# Patient Record
Sex: FEMALE | URBAN - METROPOLITAN AREA
[De-identification: names, ages, dates, MRNs, and addresses within clinical notes are randomized per-mention and may not be internally consistent; named-entity substitution may affect disease eponyms.]

---

## 2017-12-21 ENCOUNTER — CLINICAL SUPPORT (OUTPATIENT)
Dept: FAMILY MEDICINE CLINIC | Age: 47
End: 2017-12-21

## 2017-12-21 DIAGNOSIS — E66.9 OBESITY, UNSPECIFIED CLASSIFICATION, UNSPECIFIED OBESITY TYPE, UNSPECIFIED WHETHER SERIOUS COMORBIDITY PRESENT: Primary | ICD-10-CM

## 2017-12-21 NOTE — PROGRESS NOTES
Patient attended a Medically Supervised Weight Loss New Patient Orientation today where we discussed:  - New Direction Very Low Calorie Diet details  - Medical Supervision  - Nutrition education  - Cost  - Policies and compliance required for program enrollment. Patients initial consultation with physician is tentatively scheduled for:  Future Appointments  Date Time Provider Juancho Ruiz   1/4/2018 9:30 AM Gail Antonio, DO [address] [facility] starting weight was documented as: There were no vitals taken for this visit. The following patient answers were pulled from Weight Loss Questionnaire regarding weight related illness: (refer to media section for full weight loss history)  Hypertension (high blood pressure)  yes   High cholesterol no   Hypothyroidism no   Obstructive sleep apnea no   Gout no   Arthritis yes ;     Heart Attack in the last 3 months: no     Type I Diabetes no   Type II Diabetes   no

## 2018-01-11 ENCOUNTER — OFFICE VISIT (OUTPATIENT)
Dept: FAMILY MEDICINE CLINIC | Age: 48
End: 2018-01-11

## 2018-01-11 VITALS
RESPIRATION RATE: 17 BRPM | SYSTOLIC BLOOD PRESSURE: 132 MMHG | HEART RATE: 59 BPM | DIASTOLIC BLOOD PRESSURE: 81 MMHG | WEIGHT: 291.7 LBS | BODY MASS INDEX: 49.8 KG/M2 | TEMPERATURE: 98.2 F | HEIGHT: 64 IN

## 2018-01-11 DIAGNOSIS — E66.01 OBESITY, MORBID (HCC): Primary | ICD-10-CM

## 2018-01-11 DIAGNOSIS — R73.03 PREDIABETES: ICD-10-CM

## 2018-01-11 RX ORDER — FEXOFENADINE HCL AND PSEUDOEPHEDRINE HCI 180; 240 MG/1; MG/1
1 TABLET, EXTENDED RELEASE ORAL DAILY
COMMUNITY

## 2018-01-11 RX ORDER — ESOMEPRAZOLE MAGNESIUM 40 MG/1
CAPSULE, DELAYED RELEASE ORAL DAILY
COMMUNITY
End: 2018-04-24

## 2018-01-11 RX ORDER — DEXTROMETHORPHAN HYDROBROMIDE, GUAIFENESIN 5; 100 MG/5ML; MG/5ML
650 LIQUID ORAL EVERY 8 HOURS
COMMUNITY

## 2018-01-11 RX ORDER — ATENOLOL 100 MG/1
50 TABLET ORAL DAILY
COMMUNITY
Start: 2017-11-15

## 2018-01-11 RX ORDER — DESVENLAFAXINE SUCCINATE 50 MG/1
50 TABLET, EXTENDED RELEASE ORAL DAILY
COMMUNITY
Start: 2017-12-13

## 2018-01-11 NOTE — PROGRESS NOTES
[facility] Weight Loss Program Progress Note: Initial Physician Visit     Enriqueta Son is a 52 y.o. female who is here for medical screening for entering the [facility] Weight Loss Program.     CC: Morbid Obesity      Weight History  I personally reviewed the Medical Screening Barbara Plain completed by patient and scanned into media section of chart. It includes duration of their obesity, maximum weight, goal weight and weight gain time line (timing), all of which give the context of their obesity AND a Family History of their obesity. Is their Weight Loss Goal entered in to Comments? Yes 198 to start    Weight loss History  I personally reviewed the Medical Screening Barbara Plain completed by the patient and scanned into media section of chart. It includes number of weight loss attempts, the weight loss program that patients was most successful using, and if they have any hx of anorectic medication use, including OTC supplements. This captures modifying factors. Significant Medical History  Past Medical History:   Diagnosis Date    Arthritis     Depression     GERD (gastroesophageal reflux disease)     Hypertension        I personally reviewed the Medical Screening Barbara Plain completed by the patient and scanned into media section of chart. This allows me to assess associated symptoms that are significant in the assessment of the patient's obesity and the patient's Past Medical History. Outpatient Prescriptions Marked as Taking for the 1/11/18 encounter (Office Visit) with Genaro Olivas, DO   Medication Sig Dispense Refill    atenolol (TENORMIN) 100 mg tablet Take 100 mg by mouth daily.  PRISTIQ 50 mg ER tablet Take 50 mg by mouth daily.  esomeprazole (NEXIUM) 40 mg capsule Take  by mouth daily.  fexofenadine-pseudoephedrine (ALLEGRA-D 24 HOUR) 180-240 mg per tablet Take 1 Tab by mouth daily.       acetaminophen (TYLENOL ARTHRITIS PAIN) 650 mg TbER Take 650 mg by mouth every eight (8) hours. Significant Psychosocial History   Has a doctor every diagnosed with Binge Eating Disorder, Bulemia or Anorexia? : no     Compliance  Upcoming Travel? yes    Social History  Social History   Substance Use Topics    Smoking status: Never Smoker    Smokeless tobacco: Never Used    Alcohol use No       Exercise  I personally reviewed the Medical Screening Del Frankel completed by the patient and scanned into media section of chart. Review of Systems  See HPI        Objective  Visit Vitals    /81    Pulse (!) 59    Temp 98.2 °F (36.8 °C) (Oral)    Resp 17    Ht 5' 4\" (1.626 m)    Wt 291 lb 11.2 oz (132.3 kg)    LMP 11/14/2017    BMI 50.07 kg/m2         Weight Metrics 1/11/2018 1/11/2018   Weight - 291 lb 11.2 oz   Waist Measure Inches 50 -   Body Fat % 50 -   BMI - 50.07 kg/m2       Labs: See labs scanned into Media section       Physical Exam    Vital Signs Reviewed  Weight Management Metrics Reviewed    Appearance: Morbidly Obese  HEENT:  Scleral icterus?  no  Neck:  Thyromegaly or nodules? no  Mouth: Large tongue yes  Heart:  RRR  Lungs:  LCTA-B  Abdomen:     Hepatomegaly? no   Striae present? no  Skin:    Acne?  no   Hirsutism? no   Skin tags? no   Acanthosis Nigricans?  no  Ext:  Edema? no      Assessment & Plan  Encounter Diagnoses   Name Primary?  Obesity, morbid ([address] Yes    Prediabetes        1. Labs reviewed w/ patient    2. EKG reviewed w/ patient:   Personally reviewed by me, NSR, No abnormalities,    QTc = 419 sec (Upper limit is 440 for males & 460 for females)    3. Medication changes include: None    4. Based on his history, labs and EKG, Dada Ayala is now enrolled for the [facility] Weight Loss Program.     5.  Individualized Patient Plan is as follows:   Diet Regimen: 4 Meal Replacements daily.    Weigh in: weekly at [facility]   BP f/up plan:   weekly at [facility]       25 min of the 45 minutes face to face time with Malena Redd consisted of counseling & coordinating and/or discussing treatment plans in reference to her above mentioned diagnoses.

## 2018-01-11 NOTE — MR AVS SNAPSHOT
Visit Information Date & Time Provider Department Dept. Phone Encounter #  
 1/11/2018 10:30 AM Mery Kovacs, Herb[facility],[address] [facility] [phone] [id number] Follow-up Instructions Return in about 6 weeks (around 2/22/2018) for MSWL & Lab Draw. Upcoming Health Maintenance Date Due DTaP/Tdap/Td series (1 - Tdap) 9/23/1991 PAP AKA CERVICAL CYTOLOGY 9/23/1991 Influenza Age 5 to Adult 8/1/2017 Allergies as of 1/11/2018  Review Complete On: 1/11/2018 By: Mery Kovacs DO Severity Noted Reaction Type Reactions Bactrim [Sulfamethoprim]  01/11/2018    Hives Current Immunizations  Reviewed on 1/11/2018 No immunizations on file. Reviewed by Cecily Angel LPN on 5/44/0455 at 89:79 AM  
You Were Diagnosed With   
  
 Codes Comments Obesity, morbid ([facility]    -  Primary ICD-10-CM: E66.01 
ICD-9-CM: 278.01 Prediabetes     ICD-10-CM: R73.03 
ICD-9-CM: 790.29 Vitals BP Pulse Temp Resp Height(growth percentile) Weight(growth percentile) 132/81 (!) 59 98.2 °F (36.8 °C) (Oral) 17 5' 4\" (1.626 m) 291 lb 11.2 oz (132.3 kg) LMP BMI OB Status Smoking Status 11/14/2017 50.07 kg/m2 Premenopausal Never Smoker Vitals History BMI and BSA Data Body Mass Index Body Surface Area 50.07 kg/m 2 2.44 m 2 Your Updated Medication List  
  
   
This list is accurate as of: 1/11/18 11:38 AM.  Always use your most recent med list. ALLEGRA-D 24 HOUR 180-240 mg per tablet Generic drug:  fexofenadine-pseudoephedrine Take 1 Tab by mouth daily. atenolol 100 mg tablet Commonly known as:  TENORMIN Take 100 mg by mouth daily. NexIUM 40 mg capsule Generic drug:  esomeprazole Take  by mouth daily. PRISTIQ 50 mg ER tablet Generic drug:  desvenlafaxine succinate Take 50 mg by mouth daily. TYLENOL ARTHRITIS PAIN 650 mg Darus Serene Generic drug:  acetaminophen Take 650 mg by mouth every eight (8) hours. Follow-up Instructions Return in about 6 weeks (around 2/22/2018) for MSWL & Lab Draw. Patient Instructions Talk with your doctor about switching to lisinopirl instead of antenolol. Homework for [employer] 
 
 
 
Exercise Exercise daily  
- Start slow, gradually increase your time by around 10 to 20 % a week - To prevent injury, take a recovery week every 4 weeks (reduce your exercise time and intensity by 1/2 during this time) - Your goal is to work up to 150 min a week; hard enough that you can't whistle or sing. It may take 6 months to work up to this. - Call the Health  at [facility] labs for exercise ideas ([phone]) Common Side Effects (In order of how common) -Fatigue 
-Hunger 
-Constipation 
-Low sodium 
-Hair Loss 1. Fatigue Everyone goes through this stage It's normal 
It lasts 10 - 14 days It goes away It's your body adjusting to the Ketogenic diet and it's normal  
 
 
2. Hunger More common in the first few days After your body adjusts to the Ketogenic diet it will go away 3. Constipation  
-Drink at least 64 oz of non-calorie fluid a day 
-Add fiber (at least 20 grams a day) 1. Get the New Direction products with fiber added 2. Use SUGAR-FREE products: Fiber One products, Benefiber or Metamucil If you're prone to constipation: Take a Stool Softener every day. 
   (eg - Dulcolax Stool Softener 100 mg or Miralax) If you get constipated: 1. Start with a Stool Softener (produces a bowel movement in 72 hr): 
 Examples: 
    Miralax 1 capful twice a day (It's OK to go up to 3 caps for a few days) Dulcolax Stool Softener 100 mg 2. Next: If you still have constipation after 2 or 3 days, add a Stimulant Laxative (this will produce a bowel movement in 6 - 12 hr): 
 Examples: Exlax (1 small square) for up to 4 days Dulcolax stimulant laxative (8.25 mg) Magnesium citrate pill 500 mg 3 - 4 pills a day 3. Or you can go straight to a combination Stool Softner / Stimulant at night. If  you need, you can add a morning dose. Examples: 
    Pericolace 50 mg / 8.25 mg Sennakot-S  50 mg / 8.25 mg 
 
   4. Finally If You're Really locked up, get Liquid Magnesium Citrate (take  according to the directions) 4. Low blood levels of sodium 
-Not very common, especially if you're not taking a diuretic (fluid pill) If you develop a headache, feel fatigued, light-headed or dizzy Drink 1/2 cup of bouillon broth up to twice a day until you feel normal 
 
 
5. Hair loss 
-This is fairly uncommon; you may see more than a usual amount of hair in your brush or the shower drain This can happen with any physical stress (surgery, trauma or calorie restriction) or psychological stress. Although is it very concerning, it is often temporary and will resolve within 3 months. Some people notice a benefit from taking a daily dose of Biotin 2,500 mcg and increasing their Fish Oil intake. Books to [address] [place] [address] 1. Change Anything: The [employer] of Personal Success 
by Adenike Magana, Dominique Cabezas, and Maryuri Fenton 2. Mindless Eating 
by Quin Veag 3. Perfectly Yourself 
by Amado Beebe 4. Me, Myself and I - 28 Days to Creative Self-Love 
by Diana Ramirez version only Note: Reading these books is a small but significant investment in yourself. Merely following the diet will reliably result in weight loss. However, revising how you respond to stressful situations, how you relate to food and, your commitment to self-care (adequate sleep, exercise & daily reflection) is the ONLY way to protect your weight loss and free yourself from your patterns that lead to weight gain. Compliance We do not expect perfection. However, we do ask for your persistence and your willingness to do the work of growing yourself. You will hit plateaus in your weight loss. You will run into situations that test your will power. Bertha Vidal will encounter times when you feel frustrated. It's OK if you slip up from time to time. However, how your respond to your slip ups and, the adjustments you make to prevent future slip ups will determine you long-term success. If you find yourself temporarily slipping in the program, it's OK. We will gently nudge you forward and encourage you to do the work of identifying and moving beyond your stuck areas. However, if you find yourself wavering in the program for a prolonged time (more than 3 or 4 months) we will ask that you take a break from the program.  At that time you will 3 options:  
 
1. Work with our weight loss specialist [place], [place] to explore additional weight loss options. 2. Work with a counselor to do some focused work in order to identify and break the patterns that are holding you back. 3.  The combination of both these. Once you, your counselor and/or Priya feel that you have moved past those patterns and want to give the program another chance, we will gladly work with you to determine if you're ready to start back in the program. 
 
When returning after a break, if it's been less than 3 months, you do not need to repeat an orientation, labs or an EKG. If it's over been 3 months you will required to repeat an orientation and, if greater than 6 months, you will be required to repeat an orientation, labs and an EKG. Additional Tips 
 
- Consume your 4 daily meal replacements equally spaced over the    day No more than 6 hours between each meal 
 Breakfast is especially important - Get the support of family and friends 
 
- Snack-proof your house - Have strategies for social situations, meetings that run over or vacation - When you fall off the plan it's no big deal; just start right back. Turn those days into examples of what to change or avoid next time. Long-term Healthy Weight / Body Fat Different ways to determining your ideal weight: 
1. Keep your waist to less than 1/2 of your height 2. Keep your % body fat to under 30% for female and under 20% if male 3. Keep your BMI around 25 Supplements 1. Vitacost Synergy Basic Multi-Vitamin (Their In-House Brand) Take 1 capsule twice a day Found ONLY at [facility], NOT at [facility], [facility], [employer], etc 
    SKU #: [id number]  
    $16.49   / 1 month supply 
    www. "[employer]"  [phone]  
 
 
2. N-Acetyl Cysteine (NAC) -- 600 mg 
     1 pill once a day Found at many stores but [address] has a good price: 
     Item #: [id number]  $9.99 / 120 pills (4 month supply) 
     www. "[employer]"  [phone] 3. Turmeric with Black Pepper Extract (or Bioperine) 500 mg 
    1 pill 2 - 3 times a day (more is joint pain or increased inflammation) Found at many stores but [address] has a good price: 
    SKU #: [id number]  $13.64 / 61 pills 
    www. "[employer]"  [phone] 4. Probiotic: 15-35 (35 billion CFU) 1 capsule twice a day for 2 weeks, then 3 days a week SKU #: [id number]  $27.99 / 120 capsules 
     www. "[employer]"  [phone] 5. Fish Oil Take 1 capsule daily FYI:  
Typical fish oil per pill =  mg and  mg 
Krill oil per pill = EPA 50 - 70 mg and DHA 27 - 250 mg 
 
6. Other things to help with will power -Katharine Knoll Remedies Crab Apple - Helps you with self acceptance Put 4 drops in 10 oz water or a meal replacement 2 - 4 times a day Around $15 a vial which lasts ~3 months 
       www. "[employer]"  [phone] Drink Filtered Water My favorite water filter (adds minerals to your water and cheaper than Daysi) pH REFRESH Alkaline Water Pitcher Ionizer With 2 Long-Life Filters - Alkaline  Machine - Ionized Water Alkalizer Filter, 84oz, 2.5L (2017 Model) Feng Panch[place]) Sold on [employer] w/ Free Shipping 
 
 
 
^^^^^^^^^^^^^^^^^^^^^^^^^^^^^^^^^^^^^^^^^^^^^^^^^^^^^^^^^^^^^^^^^^^^^^^^^^^^^^^^^^^^^^^^^^^^^^^ Carbohydrates If you do not metabolize carbohydrates well, you will lose weight and keep the weigh off by keeping your carbohydrate intake low. How do you know if you do not metabolize carbs well? If your Triglycerides, sdLCL-C and Insulin Resistance are elevated, then you will do well to follow a low carb diet. Fun Facts About Carbs - The Typical American Diet = 450 grams a day - The Reducing Phase of the VLCD = 40 grams a day - Target for weight loss maintenance: 
 - Eat no more than 30 grams per meal 
 - Eat no more than 10 grams per snack (mid-morning and mid-afternoon snack) Resources for finding out where carbs hide in our foods: 
1. Our Registered Dietitians 2. Www. Atkins. com/tools 3. Read food labels 4. Books - The Calorie Leona Avila - The New Atkins Diet for a New You 
     - Sol Raygoza's NEW Carb and Calorie 4th Edition (my favorite) 5. Smart phone and Internet-based apps - [employer]  
     - Carb Manager If you want to get a better picture of your cardiac risk, consider getting a coronary calcium score:  Call [phone] to schedule one. Body Mass Index: Care Instructions Your Care Instructions Body mass index (BMI) can help you see if your weight is raising your risk for health problems. It uses a formula to compare how much you weigh with how tall you are. · A BMI lower than 18.5 is considered underweight. · A BMI between 18.5 and 24.9 is considered healthy. · A BMI between 25 and 29.9 is considered overweight.  A BMI of 30 or higher is considered obese. If your BMI is in the normal range, it means that you have a lower risk for weight-related health problems. If your BMI is in the overweight or obese range, you may be at increased risk for weight-related health problems, such as high blood pressure, heart disease, stroke, arthritis or joint pain, and diabetes. If your BMI is in the underweight range, you may be at increased risk for health problems such as fatigue, lower protection (immunity) against illness, muscle loss, bone loss, hair loss, and hormone problems. BMI is just one measure of your risk for weight-related health problems. You may be at higher risk for health problems if you are not active, you eat an unhealthy diet, or you drink too much alcohol or use tobacco products. Follow-up care is a key part of your treatment and safety. Be sure to make and go to all appointments, and call your doctor if you are having problems. It's also a good idea to know your test results and keep a list of the medicines you take. How can you care for yourself at home? · Practice healthy eating habits. This includes eating plenty of fruits, vegetables, whole grains, lean protein, and low-fat dairy. · If your doctor recommends it, get more exercise. Walking is a good choice. Bit by bit, increase the amount you walk every day. Try for at least 30 minutes on most days of the week. · Do not smoke. Smoking can increase your risk for health problems. If you need help quitting, talk to your doctor about stop-smoking programs and medicines. These can increase your chances of quitting for good. · Limit alcohol to 2 drinks a day for men and 1 drink a day for women. Too much alcohol can cause health problems. If you have a BMI higher than 25 · Your doctor may do other tests to check your risk for weight-related health problems.  This may include measuring the distance around your waist. A waist measurement of more than 40 inches in men or 35 inches in women can increase the risk of weight-related health problems. · Talk with your doctor about steps you can take to stay healthy or improve your health. You may need to make lifestyle changes to lose weight and stay healthy, such as changing your diet and getting regular exercise. If you have a BMI lower than 18.5 · Your doctor may do other tests to check your risk for health problems. · Talk with your doctor about steps you can take to stay healthy or improve your health. You may need to make lifestyle changes to gain or maintain weight and stay healthy, such as getting more healthy foods in your diet and doing exercises to build muscle. Where can you learn more? Go to http://cuong-mary kay.info/. Enter S176 in the search box to learn more about \"Body Mass Index: Care Instructions. \" Current as of: October 13, 2016 Content Version: 11.4 © 7693-4646 [employer]. Care instructions adapted under license by [employer] (which disclaims liability or warranty for this information). If you have questions about a medical condition or this instruction, always ask your healthcare professional. [place] [address] any warranty or liability for your use of this information. Introducing [facility]! Charlene Hawley introduces [employer] patient portal. Now you can access parts of your medical record, email your doctor's office, and request medication refills online. 1. In your internet browser, go to [email or web address] 2. Click on the First Time User? Click Here link in the Sign In box. You will see the New Member Sign Up page. 3. Enter your [employer] Access Code exactly as it appears below. You will not need to use this code after youve completed the sign-up process. If you do not sign up before the expiration date, you must request a new code. · [employer] Access Code: [email or web address] Expires: 4/11/2018 11:38 AM 
 
4. Enter the last four digits of your Social Security Number (xxxx) and Date of Birth (mm/dd/yyyy) as indicated and click Submit. You will be taken to the next sign-up page. 5. Create a [employer] ID. This will be your [employer] login ID and cannot be changed, so think of one that is secure and easy to remember. 6. Create a [employer] password. You can change your password at any time. 7. Enter your Password Reset Question and Answer. This can be used at a later time if you forget your password. 8. Enter your e-mail address. You will receive e-mail notification when new information is available in [address] 9. Click Sign Up. You can now view and download portions of your medical record. 10. Click the Download Summary menu link to download a portable copy of your medical information. If you have questions, please visit the Frequently Asked Questions section of the [employer] website. Remember, [employer] is NOT to be used for urgent needs. For medical emergencies, dial 911. Now available from your iPhone and Android! Please provide this summary of care documentation to your next provider. If you have any questions after today's visit, please call [phone].

## 2018-01-11 NOTE — PATIENT INSTRUCTIONS
Talk with your doctor about switching to lisinopirl instead of antenolol. Homework for [employer]        Exercise    Exercise daily   - Start slow, gradually increase your time by around 10 to 20 % a week    - To prevent injury, take a recovery week every 4 weeks (reduce your exercise time and intensity by 1/2 during this time)    - Your goal is to work up to 150 min a week; hard enough that you can't whistle or sing. It may take 6 months to work up to this. - Call the Health  at [facility] for exercise ideas ([phone])          Common Side Effects   (In order of how common)    -Fatigue  -Hunger  -Constipation  -Low sodium  -Hair Loss      1. Fatigue  Everyone goes through this stage  It's normal  It lasts 10 - 14 days  It goes away  It's your body adjusting to the Ketogenic diet and it's normal       2. Hunger  More common in the first few days  After your body adjusts to the Ketogenic diet it will go away       3. Constipation   -Drink at least 64 oz of non-calorie fluid a day  -Add fiber (at least 20 grams a day)     1. Get the New Direction products with fiber added     2. Use SUGAR-FREE products: Fiber One products, Benefiber or Metamucil    If you're prone to constipation: Take a Stool Softener every day.     (eg - Dulcolax Stool Softener 100 mg or Miralax)    If you get constipated:     1. Start with a Stool Softener (produces a bowel movement in 72 hr):   Examples:      Miralax 1 capful twice a day (It's OK to go up to 3 caps for a few days)      Dulcolax Stool Softener 100 mg       2. Next: If you still have constipation after 2 or 3 days, add a Stimulant          Laxative (this will produce a bowel movement in 6 - 12 hr):   Examples:      Exlax (1 small square) for up to 4 days      Dulcolax stimulant laxative (8.25 mg)       Magnesium citrate pill 500 mg 3 - 4 pills a day       3.   Or you can go straight to a combination Stool Softner / Stimulant at night.  If  you need, you can add a morning dose. Examples:      Pericolace 50 mg / 8.25 mg      Sennakot-S  50 mg / 8.25 mg       4. Finally If You're Really locked up, get Liquid Magnesium Citrate (take  according to the directions)      4. Low blood levels of sodium  -Not very common, especially if you're not taking a diuretic (fluid pill)  If you develop a headache, feel fatigued, light-headed or dizzy    Drink 1/2 cup of bouillon broth up to twice a day until you feel normal      5. Hair loss  -This is fairly uncommon; you may see more than a usual amount of hair in your brush or the shower drain  This can happen with any physical stress (surgery, trauma or calorie restriction) or psychological stress. Although is it very concerning, it is often temporary and will resolve within 3 months. Some people notice a benefit from taking a daily dose of Biotin 2,500 mcg and increasing their Fish Oil intake. Books to [address] [place] [address]    1. Change Anything: The [employer] of Personal Success  by Uziel Ortiz, Roseline Abrams, and Donta Artist    2. Mindless Eating  by Augustine Orr    3. Perfectly Yourself  by Harris Calvin    4. Me, Myself and I - 28 Days to Creative Self-Love  by Bruce Brody version only    Note: Reading these books is a small but significant investment in yourself. Merely following the diet will reliably result in weight loss. However, revising how you respond to stressful situations, how you relate to food and, your commitment to self-care (adequate sleep, exercise & daily reflection) is the ONLY way to protect your weight loss and free yourself from your patterns that lead to weight gain. Compliance    We do not expect perfection. However, we do ask for your persistence and your willingness to do the work of growing yourself. You will hit plateaus in your weight loss. You will run into situations that test your will power.   Kami Angulo will encounter times when you feel frustrated. It's OK if you slip up from time to time. However, how your respond to your slip ups and, the adjustments you make to prevent future slip ups will determine you long-term success. If you find yourself temporarily slipping in the program, it's OK. We will gently nudge you forward and encourage you to do the work of identifying and moving beyond your stuck areas. However, if you find yourself wavering in the program for a prolonged time (more than 3 or 4 months) we will ask that you take a break from the program.  At that time you will 3 options:     1. Work with our weight loss specialist LEIXS Madsen to explore additional weight loss options. 2. Work with a counselor to do some focused work in order to identify and break the patterns that are holding you back. 3.  The combination of both these. Once you, your counselor and/or Priya feel that you have moved past those patterns and want to give the program another chance, we will gladly work with you to determine if you're ready to start back in the program.    When returning after a break, if it's been less than 3 months, you do not need to repeat an orientation, labs or an EKG. If it's over been 3 months you will required to repeat an orientation and, if greater than 6 months, you will be required to repeat an orientation, labs and an EKG. Additional Tips    - Consume your 4 daily meal replacements equally spaced over the    day   No more than 6 hours between each meal   Breakfast is especially important    - Get the support of family and friends    - Snack-proof your house    - Have strategies for social situations, meetings that run over or vacation      - When you fall off the plan it's no big deal; just start right back. Turn those days into examples of what to change or avoid next time. Long-term Healthy Weight / Body Fat  Different ways to determining your ideal weight:  1.  Keep your waist to less than 1/2 of your height   2. Keep your % body fat to under 30% for female and under 20% if male  3. Keep your BMI around 25          Supplements      1. Vitacost Synergy Basic Multi-Vitamin (Their In-House Brand)      Take 1 capsule twice a day        Found ONLY at [facility], NOT at [facility], [place], [facility], the [facility], etc      SKU #: [id number]       $16.49   / 1 month supply      www. Admittedly  [phone]       2. N-Acetyl Cysteine (NAC) -- 600 mg       1 pill once a day       Found at many stores but Aj Ibarra has a good price:       Item #: [id number]  $9.99 / 120 pills (4 month supply)       www. Admittedly  [phone]      3. Turmeric with Black Pepper Extract (or Bioperine) 500 mg      1 pill 2 - 3 times a day (more is joint pain or increased inflammation)      Found at many stores but [employer] has a good price:      SKU #: [id number]  $13.64 / 60 pills      www. [employer]mitted[employer]  [phone]       4. Probiotic: 15-35 (35 billion CFU)         1 capsule twice a day for 2 weeks, then 3 days a week       SKU #: [id number]  $27.99 / 120 capsules       www. [employer]mittedly  [phone]       5. Fish Oil      Take 1 capsule daily                             FYI:   Typical fish oil per pill =  mg and  mg  Krill oil per pill = EPA 50 - 70 mg and DHA 27 - 250 mg    6. Other things to help with will power      -[address] you with self acceptance           Put 4 drops in 10 oz water or a meal replacement 2 - 4 times a day         Around $15 a vial which lasts ~3 months         www. [employer]  [phone]                Drink Filtered Water    My favorite water filter (adds minerals to your water and cheaper than Daysi)    pH REFRESH Alkaline Water Pitcher Ionizer With 2 Long-Life Filters - Alkaline  Machine - Ionized Water Alkalizer Filter, 84oz, 2.5L (2017 Model) (White)   Sold on [employer] w/ Free Shipping        ^^^^^^^^^^^^^^^^^^^^^^^^^^^^^^^^^^^^^^^^^^^^^^^^^^^^^^^^^^^^^^^^^^^^^^^^^^^^^^^^^^^^^^^^^^^^^^^      Carbohydrates     If you do not metabolize carbohydrates well, you will lose weight and keep the weigh off by keeping your carbohydrate intake low. How do you know if you do not metabolize carbs well? If your Triglycerides, sdLCL-C and Insulin Resistance are elevated, then you will do well to follow a low carb diet. Fun Facts About Carbs    - The Typical American Diet = 450 grams a day    - The Reducing Phase of the VLCD = 40 grams a day    - Target for weight loss maintenance:   - Eat no more than 30 grams per meal   - Eat no more than 10 grams per snack (mid-morning and mid-afternoon snack)        Resources for finding out where carbs hide in our foods:  1. Our Registered Dietitians    2. Www. Atkins. com/tools    3. Read food labels    4. Books       - The Calorie Malika Moors       - The Trey System Diet for a New You       - Sol Raygoza's NEW Carb and Calorie 4th Edition (my favorite)    5. Smart phone and Internet-based apps       - NONOPal        - Carb Manager      If you want to get a better picture of your cardiac risk, consider getting a coronary calcium score:  Call [phone] to schedule one. Body Mass Index: Care Instructions  Your Care Instructions    Body mass index (BMI) can help you see if your weight is raising your risk for health problems. It uses a formula to compare how much you weigh with how tall you are. · A BMI lower than 18.5 is considered underweight. · A BMI between 18.5 and 24.9 is considered healthy. · A BMI between 25 and 29.9 is considered overweight. A BMI of 30 or higher is considered obese. If your BMI is in the normal range, it means that you have a lower risk for weight-related health problems.  If your BMI is in the overweight or obese range, you may be at increased risk for weight-related health problems, such as high blood pressure, heart disease, stroke, arthritis or joint pain, and diabetes. If your BMI is in the underweight range, you may be at increased risk for health problems such as fatigue, lower protection (immunity) against illness, muscle loss, bone loss, hair loss, and hormone problems. BMI is just one measure of your risk for weight-related health problems. You may be at higher risk for health problems if you are not active, you eat an unhealthy diet, or you drink too much alcohol or use tobacco products. Follow-up care is a key part of your treatment and safety. Be sure to make and go to all appointments, and call your doctor if you are having problems. It's also a good idea to know your test results and keep a list of the medicines you take. How can you care for yourself at home? · Practice healthy eating habits. This includes eating plenty of fruits, vegetables, whole grains, lean protein, and low-fat dairy. · If your doctor recommends it, get more exercise. Walking is a good choice. Bit by bit, increase the amount you walk every day. Try for at least 30 minutes on most days of the week. · Do not smoke. Smoking can increase your risk for health problems. If you need help quitting, talk to your doctor about stop-smoking programs and medicines. These can increase your chances of quitting for good. · Limit alcohol to 2 drinks a day for men and 1 drink a day for women. Too much alcohol can cause health problems. If you have a BMI higher than 25  · Your doctor may do other tests to check your risk for weight-related health problems. This may include measuring the distance around your waist. A waist measurement of more than 40 inches in men or 35 inches in women can increase the risk of weight-related health problems. · Talk with your doctor about steps you can take to stay healthy or improve your health.  You may need to make lifestyle changes to lose weight and stay healthy, such as changing your diet and getting regular exercise. If you have a BMI lower than 18.5  · Your doctor may do other tests to check your risk for health problems. · Talk with your doctor about steps you can take to stay healthy or improve your health. You may need to make lifestyle changes to gain or maintain weight and stay healthy, such as getting more healthy foods in your diet and doing exercises to build muscle. Where can you learn more? Go to http://cuong-mary kay.info/. Enter S176 in the search box to learn more about \"Body Mass Index: Care Instructions. \"  Current as of: October 13, 2016  Content Version: 11.4  © 9306-9903 [employer]. Care instructions adapted under license by [employer] (which disclaims liability or warranty for this information). If you have questions about a medical condition or this instruction, always ask your healthcare professional. [address] any warranty or liability for your use of this information.

## 2018-01-19 ENCOUNTER — CLINICAL SUPPORT (OUTPATIENT)
Dept: FAMILY MEDICINE CLINIC | Age: 48
End: 2018-01-19

## 2018-01-19 VITALS
DIASTOLIC BLOOD PRESSURE: 77 MMHG | WEIGHT: 285 LBS | HEIGHT: 64 IN | HEART RATE: 72 BPM | BODY MASS INDEX: 48.65 KG/M2 | SYSTOLIC BLOOD PRESSURE: 138 MMHG

## 2018-01-19 DIAGNOSIS — E66.01 OBESITY, MORBID (HCC): Primary | ICD-10-CM

## 2018-01-25 ENCOUNTER — CLINICAL SUPPORT (OUTPATIENT)
Dept: FAMILY MEDICINE CLINIC | Age: 48
End: 2018-01-25

## 2018-01-25 VITALS
SYSTOLIC BLOOD PRESSURE: 139 MMHG | DIASTOLIC BLOOD PRESSURE: 87 MMHG | WEIGHT: 281.9 LBS | BODY MASS INDEX: 48.13 KG/M2 | HEART RATE: 54 BPM | HEIGHT: 64 IN

## 2018-01-25 DIAGNOSIS — E66.01 OBESITY, MORBID (HCC): Primary | ICD-10-CM

## 2018-01-25 NOTE — PROGRESS NOTES
Progress Note: Weekly Education Class in the [facility]   Is there anything that you or the patient needs to let Dr Cheko Gipson know about? no  Over the past week, have you experienced any side-effects? no    Rancho Quiles is a 52 y.o. female who is enrolled in [facility]    Visit Vitals    /87    Pulse (!) 54    Ht 5' 4\" (1.626 m)    Wt 281 lb 14.4 oz (127.9 kg)    BMI 48.39 kg/m2     Weight Metrics 1/25/2018 1/19/2018 1/11/2018 1/11/2018   Weight 281 lb 14.4 oz 285 lb - 291 lb 11.2 oz   Waist Measure Inches 49 - 50 -   Body Fat % 48.3 - 50 -   BMI 48.39 kg/m2 48.92 kg/m2 - 50.07 kg/m2         Have you received any other medical care this week? no  If yes, where and for what? Have you had any change in your medications since your last visit? no  If yes what? Did you have any problems adhering to the program last week? no  If yes, please explain:       Eating Habits Over Last Week:  Did you take in 64 oz of non-caloric fluids? yes     Did you consume your 4 meal replacements each day?  yes       Physical Activity Over the Past Week:    Aerobic exercise: 90 min  Resistance exercise: 0 workouts / week

## 2018-02-02 ENCOUNTER — CLINICAL SUPPORT (OUTPATIENT)
Dept: FAMILY MEDICINE CLINIC | Age: 48
End: 2018-02-02

## 2018-02-02 VITALS
SYSTOLIC BLOOD PRESSURE: 139 MMHG | HEIGHT: 64 IN | WEIGHT: 281 LBS | BODY MASS INDEX: 47.97 KG/M2 | DIASTOLIC BLOOD PRESSURE: 86 MMHG

## 2018-02-02 DIAGNOSIS — E66.01 MORBID OBESITY (HCC): Primary | ICD-10-CM

## 2018-02-02 NOTE — PROGRESS NOTES
Progress Note: Weekly Education Class in the [facility]   Is there anything that you or the patient needs to let Dr Lupis Hayward know about? no  Over the past week, have you experienced any side-effects? no    Dwain Baker is a 52 y.o. female who is enrolled in [facility]    Visit Vitals    /86 (BP 1 Location: Left arm, BP Patient Position: Sitting)    Ht 5' 4\" (1.626 m)    Wt 281 lb (127.5 kg)    BMI 48.23 kg/m2     Weight Metrics 2/2/2018 1/25/2018 1/19/2018 1/11/2018 1/11/2018   Weight 281 lb 281 lb 14.4 oz 285 lb - 291 lb 11.2 oz   Waist Measure Inches 50 49 - 50 -   Exercise Mins/week 30 - - - -   Body Fat % 48.2 48.3 - 50 -   BMI 48.23 kg/m2 48.39 kg/m2 48.92 kg/m2 - 50.07 kg/m2         Have you received any other medical care this week? no  If yes, where and for what? Have you had any change in your medications since your last visit? no  If yes what? Did you have any problems adhering to the program last week? no  If yes, please explain:       Eating Habits Over Last Week:  Did you take in 64 oz of non-caloric fluids? yes     Did you consume your 4 meal replacements each day?  yes       Physical Activity Over the Past Week:    Aerobic exercise: 120 min  Resistance exercise: 5 workouts / week

## 2018-02-09 ENCOUNTER — CLINICAL SUPPORT (OUTPATIENT)
Dept: FAMILY MEDICINE CLINIC | Age: 48
End: 2018-02-09

## 2018-02-16 ENCOUNTER — CLINICAL SUPPORT (OUTPATIENT)
Dept: FAMILY MEDICINE CLINIC | Age: 48
End: 2018-02-16

## 2018-02-16 VITALS
DIASTOLIC BLOOD PRESSURE: 93 MMHG | WEIGHT: 271.7 LBS | BODY MASS INDEX: 46.38 KG/M2 | HEIGHT: 64 IN | HEART RATE: 83 BPM | SYSTOLIC BLOOD PRESSURE: 138 MMHG

## 2018-02-16 DIAGNOSIS — E66.01 MORBID OBESITY (HCC): Primary | ICD-10-CM

## 2018-02-16 NOTE — PROGRESS NOTES
Progress Note: Weekly Education Class in the [facility] Program   Is there anything that you or the patient needs to let Dr Gabi Hoffman know about? no  Over the past week, have you experienced any side-effects? no    Lonn Sites is a 52 y.o. female who is enrolled in [facility]    Visit Vitals    BP (!) 138/93    Pulse 83    Ht 5' 4\" (1.626 m)    Wt 271 lb 11.2 oz (123.2 kg)    BMI 46.64 kg/m2     Weight Metrics 2/16/2018 2/2/2018 1/25/2018 1/19/2018 1/11/2018 1/11/2018   Weight 271 lb 11.2 oz 281 lb 281 lb 14.4 oz 285 lb - 291 lb 11.2 oz   Waist Measure Inches 50 50 49 - 50 -   Exercise Mins/week - 30 - - - -   Body Fat % 46 48.2 48.3 - 50 -   BMI 46.64 kg/m2 48.23 kg/m2 48.39 kg/m2 48.92 kg/m2 - 50.07 kg/m2         Have you received any other medical care this week? no  If yes, where and for what? Have you had any change in your medications since your last visit? no  If yes what? Did you have any problems adhering to the program last week? no  If yes, please explain:       Eating Habits Over Last Week:  Did you take in 64 oz of non-caloric fluids? yes     Did you consume your 4 meal replacements each day?  yes       Physical Activity Over the Past Week:    Aerobic exercise: 0 min  Resistance exercise: 0 workouts / week

## 2018-02-23 ENCOUNTER — CLINICAL SUPPORT (OUTPATIENT)
Dept: FAMILY MEDICINE CLINIC | Age: 48
End: 2018-02-23

## 2018-02-23 VITALS
SYSTOLIC BLOOD PRESSURE: 118 MMHG | WEIGHT: 268.2 LBS | BODY MASS INDEX: 45.79 KG/M2 | HEIGHT: 64 IN | HEART RATE: 55 BPM | DIASTOLIC BLOOD PRESSURE: 74 MMHG

## 2018-02-23 DIAGNOSIS — E66.01 MORBID OBESITY (HCC): Primary | ICD-10-CM

## 2018-02-23 NOTE — PROGRESS NOTES
Progress Note: Weekly Education Class in the [facility]   Is there anything that you or the patient needs to let Dr Quintero Nicholas know about? no  Over the past week, have you experienced any side-effects? no    Jacoby Nolan is a 52 y.o. female who is enrolled in [facility]    Visit Vitals    /74    Pulse (!) 55    Ht 5' 4\" (1.626 m)    Wt 268 lb 3.2 oz (121.7 kg)    BMI 46.04 kg/m2     Weight Metrics 2/23/2018 2/16/2018 2/2/2018 1/25/2018 1/19/2018 1/11/2018 1/11/2018   Weight 268 lb 3.2 oz 271 lb 11.2 oz 281 lb 281 lb 14.4 oz 285 lb - 291 lb 11.2 oz   Waist Measure Inches 48 50 50 49 - 50 -   Exercise Mins/week - - 30 - - - -   Body Fat % 46 46 48.2 48.3 - 50 -   BMI 46.04 kg/m2 46.64 kg/m2 48.23 kg/m2 48.39 kg/m2 48.92 kg/m2 - 50.07 kg/m2         Have you received any other medical care this week? no  If yes, where and for what? Have you had any change in your medications since your last visit? no  If yes what? Did you have any problems adhering to the program last week? no  If yes, please explain:       Eating Habits Over Last Week:  Did you take in 64 oz of non-caloric fluids? yes     Did you consume your 4 meal replacements each day?  yes       Physical Activity Over the Past Week:    Aerobic exercise: 0 min  Resistance exercise: 0 workouts / week

## 2018-03-02 ENCOUNTER — CLINICAL SUPPORT (OUTPATIENT)
Dept: FAMILY MEDICINE CLINIC | Age: 48
End: 2018-03-02

## 2018-03-02 VITALS
HEART RATE: 66 BPM | WEIGHT: 264.7 LBS | DIASTOLIC BLOOD PRESSURE: 90 MMHG | HEIGHT: 64 IN | BODY MASS INDEX: 45.19 KG/M2 | SYSTOLIC BLOOD PRESSURE: 137 MMHG

## 2018-03-02 DIAGNOSIS — E66.01 MORBID OBESITY (HCC): Primary | ICD-10-CM

## 2018-03-02 NOTE — PROGRESS NOTES
Progress Note: Weekly Education Class in the [facility] Weight Loss Program   Is there anything that you or the patient needs to let Dr Veronica Florian know about? no  Over the past week, have you experienced any side-effects? no    Zuleika De Los Santos is a 52 y.o. female who is enrolled in [facility]    Visit Vitals    /90    Pulse 66    Ht 5' 4\" (1.626 m)    Wt 264 lb 11.2 oz (120.1 kg)    BMI 45.44 kg/m2     Weight Metrics 3/2/2018 2/23/2018 2/16/2018 2/2/2018 1/25/2018 1/19/2018 1/11/2018   Weight 264 lb 11.2 oz 268 lb 3.2 oz 271 lb 11.2 oz 281 lb 281 lb 14.4 oz 285 lb -   Waist Measure Inches - 48 50 50 49 - 50   Exercise Mins/week - - - 30 - - -   Body Fat % 45.4 46 46 48.2 48.3 - 50   BMI 45.44 kg/m2 46.04 kg/m2 46.64 kg/m2 48.23 kg/m2 48.39 kg/m2 48.92 kg/m2 -         Have you received any other medical care this week? no  If yes, where and for what? Have you had any change in your medications since your last visit? no  If yes what? Did you have any problems adhering to the program last week? no  If yes, please explain:       Eating Habits Over Last Week:  Did you take in 64 oz of non-caloric fluids? yes     Did you consume your 4 meal replacements each day?  yes       Physical Activity Over the Past Week:    Aerobic exercise: 0 min  Resistance exercise: 0 workouts / week

## 2018-03-06 ENCOUNTER — OFFICE VISIT (OUTPATIENT)
Dept: FAMILY MEDICINE CLINIC | Age: 48
End: 2018-03-06

## 2018-03-06 VITALS
TEMPERATURE: 96.7 F | WEIGHT: 264.7 LBS | SYSTOLIC BLOOD PRESSURE: 126 MMHG | HEIGHT: 64 IN | DIASTOLIC BLOOD PRESSURE: 83 MMHG | HEART RATE: 64 BPM | RESPIRATION RATE: 17 BRPM | BODY MASS INDEX: 45.19 KG/M2

## 2018-03-06 DIAGNOSIS — R73.03 PREDIABETES: ICD-10-CM

## 2018-03-06 DIAGNOSIS — E66.01 OBESITY, MORBID (HCC): Primary | ICD-10-CM

## 2018-03-06 NOTE — PATIENT INSTRUCTIONS
Body Mass Index: Care Instructions  Your Care Instructions    Body mass index (BMI) can help you see if your weight is raising your risk for health problems. It uses a formula to compare how much you weigh with how tall you are. · A BMI lower than 18.5 is considered underweight. · A BMI between 18.5 and 24.9 is considered healthy. · A BMI between 25 and 29.9 is considered overweight. A BMI of 30 or higher is considered obese. If your BMI is in the normal range, it means that you have a lower risk for weight-related health problems. If your BMI is in the overweight or obese range, you may be at increased risk for weight-related health problems, such as high blood pressure, heart disease, stroke, arthritis or joint pain, and diabetes. If your BMI is in the underweight range, you may be at increased risk for health problems such as fatigue, lower protection (immunity) against illness, muscle loss, bone loss, hair loss, and hormone problems. BMI is just one measure of your risk for weight-related health problems. You may be at higher risk for health problems if you are not active, you eat an unhealthy diet, or you drink too much alcohol or use tobacco products. Follow-up care is a key part of your treatment and safety. Be sure to make and go to all appointments, and call your doctor if you are having problems. It's also a good idea to know your test results and keep a list of the medicines you take. How can you care for yourself at home? · Practice healthy eating habits. This includes eating plenty of fruits, vegetables, whole grains, lean protein, and low-fat dairy. · If your doctor recommends it, get more exercise. Walking is a good choice. Bit by bit, increase the amount you walk every day. Try for at least 30 minutes on most days of the week. · Do not smoke. Smoking can increase your risk for health problems. If you need help quitting, talk to your doctor about stop-smoking programs and medicines. These can increase your chances of quitting for good. · Limit alcohol to 2 drinks a day for men and 1 drink a day for women. Too much alcohol can cause health problems. If you have a BMI higher than 25  · Your doctor may do other tests to check your risk for weight-related health problems. This may include measuring the distance around your waist. A waist measurement of more than 40 inches in men or 35 inches in women can increase the risk of weight-related health problems. · Talk with your doctor about steps you can take to stay healthy or improve your health. You may need to make lifestyle changes to lose weight and stay healthy, such as changing your diet and getting regular exercise. If you have a BMI lower than 18.5  · Your doctor may do other tests to check your risk for health problems. · Talk with your doctor about steps you can take to stay healthy or improve your health. You may need to make lifestyle changes to gain or maintain weight and stay healthy, such as getting more healthy foods in your diet and doing exercises to build muscle. Where can you learn more? Go to http://cuong-mary kay.info/. Enter S176 in the search box to learn more about \"Body Mass Index: Care Instructions. \"  Current as of: October 13, 2016  Content Version: 11.4  © 7183-9675 [employer]. Care instructions adapted under license by [employer] (which disclaims liability or warranty for this information). If you have questions about a medical condition or this instruction, always ask your healthcare professional. [address] any warranty or liability for your use of this information.

## 2018-03-06 NOTE — MR AVS SNAPSHOT
Martin Ray Lima [address] [facility] [address] [facility] [address] [id number] 
[phone] Patient: Brandon Sanabria MRN: [id number] ZAO:[id number] Visit Information Date & Time Provider Department Dept. Phone Encounter #  
 3/6/2018  1:30 PM Elizabeth Araujo, [address] [place] [address],[address] [phone] [id number] Follow-up Instructions Return in about 4 weeks (around 4/3/2018). Your Appointments 3/16/2018  9:85 PM  
METABOLIC PROGRAM 5 with HRMP WEEKLY CLASS AMA  
HR Metabolic Program ([facility] [address]) Appt Note: weigh in  
 HR Metabolic Program ([facility] [address] [address]) [phone]  
  
    
 3/23/2018  7:64 PM  
METABOLIC PROGRAM 5 with HRMP WEEKLY CLASS AMA  
HR Metabolic Program ([address] [place], [place] [address]) Appt Note: weigh in  
 HR Metabolic Program ([address] [place], [place] [address]) [phone]  
  
    
 3/30/2018  7:72 PM  
METABOLIC PROGRAM 5 with HRMP WEEKLY CLASS AMA  
HR Metabolic Program ([facility] [address]) Appt Note: weigh in  
 HR Metabolic Program ([address]) [phone] 4/13/2018  4:92 PM  
METABOLIC PROGRAM 5 with HRMP WEEKLY CLASS AMA  
HR Metabolic Program ([facility] [address]) Appt Note: weigh in  
 HR Metabolic Program ([address] [place], [place] [address]) [phone] Upcoming Health Maintenance Date Due DTaP/Tdap/Td series (1 - Tdap) 9/23/1991 PAP AKA CERVICAL CYTOLOGY 9/23/1991 Influenza Age 5 to Adult 8/1/2017 Allergies as of 3/6/2018  Review Complete On: 3/6/2018 By: Elizabeth Araujo DO Severity Noted Reaction Type Reactions Bactrim [Sulfamethoprim]  01/11/2018    Hives Current Immunizations  Reviewed on 3/6/2018 No immunizations on file. Reviewed by Darryl Panda LPN on 1/2/4181 at  6:82 PM  
You Were Diagnosed With   
  
 Codes Comments Obesity, morbid ([facility]    -  Primary ICD-10-CM: E66.01 
ICD-9-CM: 278.01 Prediabetes     ICD-10-CM: R73.03 
ICD-9-CM: 790.29 Vitals BP Pulse Temp Resp Height(growth percentile) Weight(growth percentile) 126/83 64 96.7 °F (35.9 °C) (Oral) 17 5' 4\" (1.626 m) 264 lb 11.2 oz (120.1 kg) BMI OB Status Smoking Status 45.44 kg/m2 Premenopausal Never Smoker Vitals History BMI and BSA Data Body Mass Index Body Surface Area 45.44 kg/m 2 2.33 m 2 Your Updated Medication List  
  
   
This list is accurate as of 3/6/18  1:53 PM.  Always use your most recent med list. ALLEGRA-D 24 HOUR 180-240 mg per tablet Generic drug:  fexofenadine-pseudoephedrine Take 1 Tab by mouth daily. atenolol 100 mg tablet Commonly known as:  TENORMIN Take 100 mg by mouth daily. NexIUM 40 mg capsule Generic drug:  esomeprazole Take  by mouth daily. PRISTIQ 50 mg ER tablet Generic drug:  desvenlafaxine succinate Take 50 mg by mouth daily. TYLENOL ARTHRITIS PAIN 650 mg Tiesha Perfect Generic drug:  acetaminophen Take 650 mg by mouth every eight (8) hours. Follow-up Instructions Return in about 4 weeks (around 4/3/2018). To-Do List   
 03/06/2018 Lab:  METABOLIC PANEL, COMPREHENSIVE   
  
 03/06/2018 Lab:  URIC ACID Patient Instructions Body Mass Index: Care Instructions Your Care Instructions Body mass index (BMI) can help you see if your weight is raising your risk for health problems. It uses a formula to compare how much you weigh with how tall you are. · A BMI lower than 18.5 is considered underweight. · A BMI between 18.5 and 24.9 is considered healthy. · A BMI between 25 and 29.9 is considered overweight. A BMI of 30 or higher is considered obese. If your BMI is in the normal range, it means that you have a lower risk for weight-related health problems.  If your BMI is in the overweight or obese range, you may be at increased risk for weight-related health problems, such as high blood pressure, heart disease, stroke, arthritis or joint pain, and diabetes. If your BMI is in the underweight range, you may be at increased risk for health problems such as fatigue, lower protection (immunity) against illness, muscle loss, bone loss, hair loss, and hormone problems. BMI is just one measure of your risk for weight-related health problems. You may be at higher risk for health problems if you are not active, you eat an unhealthy diet, or you drink too much alcohol or use tobacco products. Follow-up care is a key part of your treatment and safety. Be sure to make and go to all appointments, and call your doctor if you are having problems. It's also a good idea to know your test results and keep a list of the medicines you take. How can you care for yourself at home? · Practice healthy eating habits. This includes eating plenty of fruits, vegetables, whole grains, lean protein, and low-fat dairy. · If your doctor recommends it, get more exercise. Walking is a good choice. Bit by bit, increase the amount you walk every day. Try for at least 30 minutes on most days of the week. · Do not smoke. Smoking can increase your risk for health problems. If you need help quitting, talk to your doctor about stop-smoking programs and medicines. These can increase your chances of quitting for good. · Limit alcohol to 2 drinks a day for men and 1 drink a day for women. Too much alcohol can cause health problems. If you have a BMI higher than 25 · Your doctor may do other tests to check your risk for weight-related health problems. This may include measuring the distance around your waist. A waist measurement of more than 40 inches in men or 35 inches in women can increase the risk of weight-related health problems. · Talk with your doctor about steps you can take to stay healthy or improve your health. You may need to make lifestyle changes to lose weight and stay healthy, such as changing your diet and getting regular exercise. If you have a BMI lower than 18.5 · Your doctor may do other tests to check your risk for health problems. · Talk with your doctor about steps you can take to stay healthy or improve your health. You may need to make lifestyle changes to gain or maintain weight and stay healthy, such as getting more healthy foods in your diet and doing exercises to build muscle. Where can you learn more? Go to http://cuong-mary kay.info/. Enter S176 in the search box to learn more about \"Body Mass Index: Care Instructions. \" Current as of: October 13, 2016 Content Version: 11.4 © 6679-4191 [employer]. Care instructions adapted under license by [employer] (which disclaims liability or warranty for this information). If you have questions about a medical condition or this instruction, always ask your healthcare professional. [address] any warranty or liability for your use of this information. Introducing [facility]! Hilda Conklin introduces E-Blink patient portal. Now you can access parts of your medical record, email your doctor's office, and request medication refills online. 1. In your internet browser, go to [email or web address] 2. Click on the First Time User? Click Here link in the Sign In box. You will see the New Member Sign Up page. 3. Enter your E-Blink Access Code exactly as it appears below. You will not need to use this code after youve completed the sign-up process. If you do not sign up before the expiration date, you must request a new code. · E-Blink Access Code: [email or web address] Expires: 4/11/2018 11:38 AM 
 
4. Enter the last four digits of your Social Security Number (xxxx) and Date of Birth (mm/dd/yyyy) as indicated and click Submit. You will be taken to the next sign-up page. 5. Create a E-Blink ID.  This will be your E-Blink login ID and cannot be changed, so think of one that is secure and easy to remember. 6. Create a twenty5media password. You can change your password at any time. 7. Enter your Password Reset Question and Answer. This can be used at a later time if you forget your password. 8. Enter your e-mail address. You will receive e-mail notification when new information is available in [address] 9. Click Sign Up. You can now view and download portions of your medical record. 10. Click the Download Summary menu link to download a portable copy of your medical information. If you have questions, please visit the Frequently Asked Questions section of the [employer] website. Remember, twenty5media is NOT to be used for urgent needs. For medical emergencies, dial 911. Now available from your iPhone and Android! Please provide this summary of care documentation to your next provider. If you have any questions after today's visit, please call [phone].

## 2018-03-06 NOTE — PROGRESS NOTES
[facility] Weight Loss Program Progress Note:   F/up Physician Visit for the VLCD / LCD Program    CC: Weight Management    Nicanor Kirk is a 52 y.o. female who is here for her f/up physician visit for the [facility] Program.    Weight History  Ideal body weight: 120 lb 9.5 oz (54.7 kg)  Adjusted ideal body weight: 178 lb 3.8 oz (80.8 kg) (Based on [address] [facility] [address])    Wt Readings from Last 10 Encounters:   03/06/18 264 lb 11.2 oz (120.1 kg)   03/02/18 264 lb 11.2 oz (120.1 kg)   02/23/18 268 lb 3.2 oz (121.7 kg)   02/16/18 271 lb 11.2 oz (123.2 kg)   02/02/18 281 lb (127.5 kg)   01/25/18 281 lb 14.4 oz (127.9 kg)   01/19/18 285 lb (129.3 kg)   01/11/18 291 lb 11.2 oz (132.3 kg)       Weight Metrics 3/6/2018 3/6/2018 3/2/2018 2/23/2018 2/16/2018 2/2/2018 1/25/2018   Weight - 264 lb 11.2 oz 264 lb 11.2 oz 268 lb 3.2 oz 271 lb 11.2 oz 281 lb 281 lb 14.4 oz   Waist Measure Inches 45 - - 48 50 50 49   Exercise Mins/week - - - - - 30 -   Body Fat % 45 - 45.4 46 46 48.2 48.3   BMI - 45.44 kg/m2 45.44 kg/m2 46.04 kg/m2 46.64 kg/m2 48.23 kg/m2 48.39 kg/m2         Medications Currently Taking  Outpatient Prescriptions Marked as Taking for the 3/6/18 encounter (Office Visit) with Mario Peters, DO   Medication Sig Dispense Refill    atenolol (TENORMIN) 100 mg tablet Take 100 mg by mouth daily.  PRISTIQ 50 mg ER tablet Take 50 mg by mouth daily.  fexofenadine-pseudoephedrine (ALLEGRA-D 24 HOUR) 180-240 mg per tablet Take 1 Tab by mouth daily.  acetaminophen (TYLENOL ARTHRITIS PAIN) 650 mg TbER Take 650 mg by mouth every eight (8) hours. Participation   Have you been attending class on a regular basis?  yes    Review of Systems  Since your last visit, have you experienced any complications? no  If yes, please list:     Are you taking an appetite suppressant? no    BP Readings from Last 3 Encounters:   03/06/18 126/83   03/02/18 137/90   02/23/18 118/74       Have you received any other medical care this week? no  If yes, where and for what? Have you discontinued or changed any medicine or dose of your medicine(s) since your last visit with Dr Sara Dickson? yes    If yes, where and for what? Stopped Nexium      Diet  How many ounces of calorie-free liquids did you consume each day?  80 oz     How many meal replacements did you take each day? 4    Did you have any problems adhering to the program?  no   If yes, please explain:      Exercise  Aerobic exercise: 180 min  Resistance exercise: 2 workouts / week        Objective  Visit Vitals    /83    Pulse 64    Temp 96.7 °F (35.9 °C) (Oral)    Resp 17    Ht 5' 4\" (1.626 m)    Wt 264 lb 11.2 oz (120.1 kg)    BMI 45.44 kg/m2     No LMP recorded. Patient is not currently having periods (Reason: Premenopausal). Physical Exam  Appearance: Morbidly Obese, A&O x 3, NAD  HEENT:  NC/AT, EOMI, PERRL, No scleral icterus    Assessment / Plan    Encounter Diagnoses   Name Primary?  Obesity, morbid ([facility] Yes    Prediabetes        1. Weight management    improved   Progress was reviewed with patient   Goal(s) for next appointment:   Keep up         2. Labs    Latest results reviewed with patient   Routine monitoring labs ordered yes    -Lab slip given to pt for off-site [facility] labs no    10 minutes of the 15 minutes face to face time with Olivia consisted of counseling & coordinating and/or discussing treatment plans in reference to her The primary encounter diagnosis was Obesity, morbid ([facility] A diagnosis of Prediabetes was also pertinent to this visit.

## 2018-03-07 LAB
ALBUMIN SERPL-MCNC: 4 G/DL (ref 3.5–5.5)
ALBUMIN/GLOB SERPL: 1.4 {RATIO} (ref 1.2–2.2)
ALP SERPL-CCNC: 107 IU/L (ref 39–117)
ALT SERPL-CCNC: 14 IU/L (ref 0–32)
AST SERPL-CCNC: 20 IU/L (ref 0–40)
BILIRUB SERPL-MCNC: 0.2 MG/DL (ref 0–1.2)
BUN SERPL-MCNC: 21 MG/DL (ref 6–24)
BUN/CREAT SERPL: 41 (ref 9–23)
CALCIUM SERPL-MCNC: 9.5 MG/DL (ref 8.7–10.2)
CHLORIDE SERPL-SCNC: 97 MMOL/L (ref 96–106)
CO2 SERPL-SCNC: 25 MMOL/L (ref 18–29)
CREAT SERPL-MCNC: 0.51 MG/DL (ref 0.57–1)
GFR SERPLBLD CREATININE-BSD FMLA CKD-EPI: 115 ML/MIN/1.73
GFR SERPLBLD CREATININE-BSD FMLA CKD-EPI: 132 ML/MIN/1.73
GLOBULIN SER CALC-MCNC: 2.9 G/DL (ref 1.5–4.5)
GLUCOSE SERPL-MCNC: 83 MG/DL (ref 65–99)
POTASSIUM SERPL-SCNC: 4.5 MMOL/L (ref 3.5–5.2)
PROT SERPL-MCNC: 6.9 G/DL (ref 6–8.5)
SODIUM SERPL-SCNC: 141 MMOL/L (ref 134–144)
URATE SERPL-MCNC: 2.9 MG/DL (ref 2.5–7.1)

## 2018-03-15 ENCOUNTER — CLINICAL SUPPORT (OUTPATIENT)
Dept: FAMILY MEDICINE CLINIC | Age: 48
End: 2018-03-15

## 2018-03-15 VITALS
BODY MASS INDEX: 44.69 KG/M2 | HEART RATE: 64 BPM | HEIGHT: 64 IN | DIASTOLIC BLOOD PRESSURE: 72 MMHG | WEIGHT: 261.8 LBS | SYSTOLIC BLOOD PRESSURE: 112 MMHG

## 2018-03-15 DIAGNOSIS — E66.01 OBESITY, MORBID (HCC): Primary | ICD-10-CM

## 2018-03-15 NOTE — PROGRESS NOTES
Progress Note: Weekly Education Class in the [facility] Weight Loss Program   Is there anything that you or the patient needs to let Dr Shayy Evans know about? no  Over the past week, have you experienced any side-effects? no    Dada Ayala is a 52 y.o. female who is enrolled in [facility] Weight Loss Program    Visit Vitals    /72    Pulse 64    Ht 5' 4\" (1.626 m)    Wt 261 lb 12.8 oz (118.8 kg)    BMI 44.94 kg/m2     Weight Metrics 3/15/2018 3/6/2018 3/6/2018 3/2/2018 2/23/2018 2/16/2018 2/2/2018   Weight 261 lb 12.8 oz - 264 lb 11.2 oz 264 lb 11.2 oz 268 lb 3.2 oz 271 lb 11.2 oz 281 lb   Waist Measure Inches - 45 - - 48 50 50   Exercise Mins/week - - - - - - 30   Body Fat % 44 45 - 45.4 46 46 48.2   BMI 44.94 kg/m2 - 45.44 kg/m2 45.44 kg/m2 46.04 kg/m2 46.64 kg/m2 48.23 kg/m2         Have you received any other medical care this week? no  If yes, where and for what? Have you had any change in your medications since your last visit? no  If yes what? Did you have any problems adhering to the program last week? no  If yes, please explain:       Eating Habits Over Last Week:  Did you take in 64 oz of non-caloric fluids? yes     Did you consume your 4 meal replacements each day?  yes       Physical Activity Over the Past Week:    Aerobic exercise: 0 min  Resistance exercise: 0 workouts / week

## 2018-03-23 ENCOUNTER — CLINICAL SUPPORT (OUTPATIENT)
Dept: FAMILY MEDICINE CLINIC | Age: 48
End: 2018-03-23

## 2018-03-23 VITALS
DIASTOLIC BLOOD PRESSURE: 78 MMHG | BODY MASS INDEX: 44.05 KG/M2 | SYSTOLIC BLOOD PRESSURE: 128 MMHG | HEART RATE: 62 BPM | HEIGHT: 64 IN | WEIGHT: 258 LBS

## 2018-03-23 DIAGNOSIS — E66.01 OBESITY, MORBID (HCC): Primary | ICD-10-CM

## 2018-03-23 NOTE — PROGRESS NOTES
Progress Note: Weekly Education Class in the [facility] Weight Loss Program   Is there anything that you or the patient needs to let Dr Kolby Evangelista know about? no  Over the past week, have you experienced any side-effects? no    Ananya Montes is a 52 y.o. female who is enrolled in [facility] Weight Loss Program    Visit Vitals    /78    Pulse 62    Ht 5' 4\" (1.626 m)    Wt 258 lb (117 kg)    BMI 44.29 kg/m2     Weight Metrics 3/23/2018 3/15/2018 3/6/2018 3/6/2018 3/2/2018 2/23/2018 2/16/2018   Weight 258 lb 261 lb 12.8 oz - 264 lb 11.2 oz 264 lb 11.2 oz 268 lb 3.2 oz 271 lb 11.2 oz   Waist Measure Inches - - 45 - - 48 50   Exercise Mins/week - - - - - - -   Body Fat % 44.2 44 45 - 45.4 46 46   BMI 44.29 kg/m2 44.94 kg/m2 - 45.44 kg/m2 45.44 kg/m2 46.04 kg/m2 46.64 kg/m2         Have you received any other medical care this week? no  If yes, where and for what? Have you had any change in your medications since your last visit? no  If yes what? Did you have any problems adhering to the program last week? no  If yes, please explain:       Eating Habits Over Last Week:  Did you take in 64 oz of non-caloric fluids? yes     Did you consume your 4 meal replacements each day?  yes       Physical Activity Over the Past Week:    Aerobic exercise: 0 min  Resistance exercise: 0 workouts / week

## 2018-04-13 ENCOUNTER — CLINICAL SUPPORT (OUTPATIENT)
Dept: FAMILY MEDICINE CLINIC | Age: 48
End: 2018-04-13

## 2018-04-13 VITALS
HEART RATE: 71 BPM | SYSTOLIC BLOOD PRESSURE: 118 MMHG | DIASTOLIC BLOOD PRESSURE: 78 MMHG | BODY MASS INDEX: 42.84 KG/M2 | WEIGHT: 250.9 LBS | RESPIRATION RATE: 16 BRPM | HEIGHT: 64 IN

## 2018-04-13 DIAGNOSIS — E66.01 MORBID OBESITY WITH BMI OF 45.0-49.9, ADULT (HCC): Primary | ICD-10-CM

## 2018-04-13 NOTE — PROGRESS NOTES
Progress Note: Weekly Education Class in the [facility] Weight Loss Program   Is there anything that you or the patient needs to let Dr Judi Cadet know about? no  Over the past week, have you experienced any side-effects? no    Bri Yañez is a 52 y.o. female who is enrolled in [facility] Weight Loss Program    There were no vitals taken for this visit. Weight Metrics 3/23/2018 3/15/2018 3/6/2018 3/6/2018 3/2/2018 2/23/2018 2/16/2018   Weight 258 lb 261 lb 12.8 oz - 264 lb 11.2 oz 264 lb 11.2 oz 268 lb 3.2 oz 271 lb 11.2 oz   Waist Measure Inches - - 45 - - 48 50   Exercise Mins/week - - - - - - -   Body Fat % 44.2 44 45 - 45.4 46 46   BMI 44.29 kg/m2 44.94 kg/m2 - 45.44 kg/m2 45.44 kg/m2 46.04 kg/m2 46.64 kg/m2         Have you received any other medical care this week? no  If yes, where and for what? Have you had any change in your medications since your last visit? yes  If yes what? Atenolol has been decreased to 50 MG from 100 MG    Did you have any problems adhering to the program last week? no  If yes, please explain:       Eating Habits Over Last Week:  Did you take in 64 oz of non-caloric fluids? yes     Did you consume your 4 meal replacements each day?  yes       Physical Activity Over the Past Week:    Aerobic exercise: 0 min  Resistance exercise: 0 workouts / week

## 2018-04-24 ENCOUNTER — OFFICE VISIT (OUTPATIENT)
Dept: FAMILY MEDICINE CLINIC | Age: 48
End: 2018-04-24

## 2018-04-24 VITALS
HEART RATE: 67 BPM | SYSTOLIC BLOOD PRESSURE: 116 MMHG | RESPIRATION RATE: 16 BRPM | HEIGHT: 64 IN | WEIGHT: 248.9 LBS | BODY MASS INDEX: 42.49 KG/M2 | DIASTOLIC BLOOD PRESSURE: 81 MMHG

## 2018-04-24 DIAGNOSIS — E66.01 OBESITY, MORBID (HCC): Primary | ICD-10-CM

## 2018-04-24 DIAGNOSIS — R73.03 PREDIABETES: ICD-10-CM

## 2018-04-24 NOTE — PROGRESS NOTES
[facility] Weight Loss Program Progress Note:   F/up Physician Visit for the VLCD / LCD Program    CC: Weight Management    Lobo Brown is a 52 y.o. female who is here for her f/up physician visit for the [facility] Program.    Weight History  Ideal body weight: 120 lb 9.5 oz (54.7 kg)  Adjusted ideal body weight: 171 lb 14.6 oz (78 kg) (Based on [address] [facility] [address])    Wt Readings from Last 10 Encounters:   04/24/18 248 lb 14.4 oz (112.9 kg)   04/13/18 250 lb 14.4 oz (113.8 kg)   03/23/18 258 lb (117 kg)   03/15/18 261 lb 12.8 oz (118.8 kg)   03/06/18 264 lb 11.2 oz (120.1 kg)   03/02/18 264 lb 11.2 oz (120.1 kg)   02/23/18 268 lb 3.2 oz (121.7 kg)   02/16/18 271 lb 11.2 oz (123.2 kg)   02/02/18 281 lb (127.5 kg)   01/25/18 281 lb 14.4 oz (127.9 kg)       Weight Metrics 4/24/2018 4/24/2018 4/13/2018 3/23/2018 3/15/2018 3/6/2018 3/6/2018   Weight - 248 lb 14.4 oz 250 lb 14.4 oz 258 lb 261 lb 12.8 oz - 264 lb 11.2 oz   Waist Measure Inches 44.25 - 48 - - 45 -   Exercise Mins/week - - - - - - -   Body Fat % 45.4 - 45.6 44.2 44 45 -   BMI - 42.72 kg/m2 43.07 kg/m2 44.29 kg/m2 44.94 kg/m2 - 45.44 kg/m2         Medications Currently Taking    Current Outpatient Prescriptions   Medication Sig Dispense Refill    atenolol (TENORMIN) 100 mg tablet Take 50 mg by mouth daily.  PRISTIQ 50 mg ER tablet Take 50 mg by mouth daily.  fexofenadine-pseudoephedrine (ALLEGRA-D 24 HOUR) 180-240 mg per tablet Take 1 Tab by mouth daily.  acetaminophen (TYLENOL ARTHRITIS PAIN) 650 mg TbER Take 650 mg by mouth every eight (8) hours. Participation   Have you been attending class on a regular basis? yes    Review of Systems  Since your last visit, have you experienced any complications?  no  Are you taking an appetite suppressant? no    BP Readings from Last 3 Encounters:   04/24/18 116/81   04/13/18 118/78   03/23/18 128/78     Have you received any other medical care this week? yes  If yes, where and for what? Has a cold sore    Have you discontinued or changed any medicine or dose of your medicine(s) since your last visit with Dr Inés Sauer? no    Still off her Nexium    Diet  How many ounces of calorie-free liquids did you consume each day? 64 oz  How many meal replacements did you take each day? 4  Did you have any problems adhering to the program?  no       Exercise  Aerobic exercise: 0 min  Resistance exercise: 0 workouts / week  Any discomfort? yes     If yes, where? Knee      Objective  Visit Vitals    /81    Pulse 67    Resp 16    Ht 5' 4\" (1.626 m)    Wt 248 lb 14.4 oz (112.9 kg)    BMI 42.72 kg/m2     No LMP recorded. Patient is not currently having periods (Reason: Premenopausal). Physical Exam  Appearance: Morbidly Obese, A&O x 3, NAD  HEENT:  NC/AT, EOMI, PERRL, No scleral icterus    Assessment / Plan    Encounter Diagnoses   Name Primary?  Obesity, morbid ([facility] Yes    Prediabetes        1. Weight management    improved   Progress was reviewed with patient   Goal(s) for next appointment:   Keep up the good work        2. Labs    Latest results reviewed with patient   Routine monitoring labs ordered yes    -Lab slip given to pt for off-site [facility] labs no    10 minutes of the 15 minutes face to face time with Olivia consisted of counseling & coordinating and/or discussing treatment plans in reference to her The primary encounter diagnosis was Obesity, morbid ([facility] A diagnosis of Prediabetes was also pertinent to this visit.

## 2018-04-24 NOTE — MR AVS SNAPSHOT
Martin Ray [facility] [address] [facility] [address] [facility] [address] [address] 
[phone] Patient: Sol Gutiérrez MRN: [id number] VOY:[birth year] Visit Information Date & Time Provider Department Dept. Phone Encounter #  
 4/24/2018  1:30 PM Ember U[address] 13 [id number] Follow-up Instructions Return in about 4 weeks (around 5/22/2018) for MSWL & Lab Draw. Follow-up and Disposition History Upcoming Health Maintenance Date Due DTaP/Tdap/Td series (1 - Tdap) 9/23/1991 PAP AKA CERVICAL CYTOLOGY 9/23/1991 Influenza Age 5 to Adult 8/1/2017 Allergies as of 4/24/2018  Review Complete On: 4/24/2018 By: Ember Urbina DO Severity Noted Reaction Type Reactions Bactrim [Sulfamethoprim]  01/11/2018    Hives Current Immunizations  Reviewed on 3/6/2018 No immunizations on file. Not reviewed this visit You Were Diagnosed With   
  
 Codes Comments Ob[facility], mor[facility] ([facility]    -  Primary ICD-10-CM: E66.01 
ICD-9-CM: 278.01 Prediabetes     ICD-10-CM: R73.03 
ICD-9-CM: 790.29 Vitals BP Pulse Resp Height(growth percentile) Weight(growth percentile) BMI  
 116/81 67 16 5' 4\" (1.626 m) 248 lb 14.4 oz (112.9 kg) 42.72 kg/m2 OB Status Smoking Status Premenopausal Never Smoker Vitals History BMI and BSA Data Body Mass Index Body Surface Area 42.72 kg/m 2 2.26 m 2 Your Updated Medication List  
  
   
This list is accurate as of 4/24/18  2:08 PM.  Always use your most recent med list. ALLEGRA-D 24 HOUR 180-240 mg per tablet Generic drug:  fexofenadine-pseudoephedrine Take 1 Tab by mouth daily. atenolol 100 mg tablet Commonly known as:  TENORMIN Take 50 mg by mouth daily. PRISTIQ 50 mg ER tablet Generic drug:  desvenlafaxine succinate Take 50 mg by mouth daily. TYLENOL ARTHRITIS PAIN 650 mg Lisa Rubio Generic drug:  acetaminophen Take 650 mg by mouth every eight (8) hours. We Performed the Following AMB POC HEMOGLOBIN A1C [26857 CPT(R)] Follow-up Instructions Return in about 4 weeks (around 5/22/2018) for MSWL & Lab Draw. Patient Instructions Body Mass Index: Care Instructions Your Care Instructions Body mass index (BMI) can help you see if your weight is raising your risk for health problems. It uses a formula to compare how much you weigh with how tall you are. · A BMI lower than 18.5 is considered underweight. · A BMI between 18.5 and 24.9 is considered healthy. · A BMI between 25 and 29.9 is considered overweight. A BMI of 30 or higher is considered obese. If your BMI is in the normal range, it means that you have a lower risk for weight-related health problems. If your BMI is in the overweight or obese range, you may be at increased risk for weight-related health problems, such as high blood pressure, heart disease, stroke, arthritis or joint pain, and diabetes. If your BMI is in the underweight range, you may be at increased risk for health problems such as fatigue, lower protection (immunity) against illness, muscle loss, bone loss, hair loss, and hormone problems. BMI is just one measure of your risk for weight-related health problems. You may be at higher risk for health problems if you are not active, you eat an unhealthy diet, or you drink too much alcohol or use tobacco products. Follow-up care is a key part of your treatment and safety. Be sure to make and go to all appointments, and call your doctor if you are having problems. It's also a good idea to know your test results and keep a list of the medicines you take. How can you care for yourself at home? · Practice healthy eating habits. This includes eating plenty of fruits, vegetables, whole grains, lean protein, and low-fat dairy. · If your doctor recommends it, get more exercise. Walking is a good choice. Bit by bit, increase the amount you walk every day. Try for at least 30 minutes on most days of the week. · Do not smoke. Smoking can increase your risk for health problems. If you need help quitting, talk to your doctor about stop-smoking programs and medicines. These can increase your chances of quitting for good. · Limit alcohol to 2 drinks a day for men and 1 drink a day for women. Too much alcohol can cause health problems. If you have a BMI higher than 25 · Your doctor may do other tests to check your risk for weight-related health problems. This may include measuring the distance around your waist. A waist measurement of more than 40 inches in men or 35 inches in women can increase the risk of weight-related health problems. · Talk with your doctor about steps you can take to stay healthy or improve your health. You may need to make lifestyle changes to lose weight and stay healthy, such as changing your diet and getting regular exercise. If you have a BMI lower than 18.5 · Your doctor may do other tests to check your risk for health problems. · Talk with your doctor about steps you can take to stay healthy or improve your health. You may need to make lifestyle changes to gain or maintain weight and stay healthy, such as getting more healthy foods in your diet and doing exercises to build muscle. Where can you learn more? Go to http://cuong-mary kay.info/. Enter S176 in the search box to learn more about \"Body Mass Index: Care Instructions. \" Current as of: October 13, 2016 Content Version: 11.4 © 4522-9344 Healthwise, Incorporated. Care instructions adapted under license by [employer] (which disclaims liability or warranty for this information).  If you have questions about a medical condition or this instruction, always ask your healthcare professional. Tyrone Thompson, [employer]orporated disclaims any warranty or liability for your use of this information. Patient Instructions History Introducing [facility] & HEALTH SERVICES! [facility] introduces [employer] patient portal. Now you can access parts of your medical record, email your doctor's office, and request medication refills online. 1. In your internet browser, go to [email or web address]://"[email or web address]". [email or web address] 2. Click on the First Time User? Click Here link in the Sign In box. You will see the New Member Sign Up page. 3. Enter your [employer] Access Code exactly as it appears below. You will not need to use this code after youve completed the sign-up process. If you do not sign up before the expiration date, you must request a new code. · [employer] Access Code: [email or web address] Expires: 7/23/2018  2:03 PM 
 
4. Enter the last four digits of your Social Security Number (xxxx) and Date of Birth (mm/dd/yyyy) as indicated and click Submit. You will be taken to the next sign-up page. 5. Create a [employer] ID. This will be your [employer] login ID and cannot be changed, so think of one that is secure and easy to remember. 6. Create a [employer] password. You can change your password at any time. 7. Enter your Password Reset Question and Answer. This can be used at a later time if you forget your password. 8. Enter your e-mail address. You will receive e-mail notification when new information is available in [address] 9. Click Sign Up. You can now view and download portions of your medical record. 10. Click the Download Summary menu link to download a portable copy of your medical information. If you have questions, please visit the Frequently Asked Questions section of the [employer] website. Remember, [employer] is NOT to be used for urgent needs. For medical emergencies, dial 911. Now available from your iPhone and Android! Please provide this summary of care documentation to your next provider. If you have any questions after today's visit, please call [phone].

## 2018-04-24 NOTE — PATIENT INSTRUCTIONS
Body Mass Index: Care Instructions  Your Care Instructions    Body mass index (BMI) can help you see if your weight is raising your risk for health problems. It uses a formula to compare how much you weigh with how tall you are. · A BMI lower than 18.5 is considered underweight. · A BMI between 18.5 and 24.9 is considered healthy. · A BMI between 25 and 29.9 is considered overweight. A BMI of 30 or higher is considered obese. If your BMI is in the normal range, it means that you have a lower risk for weight-related health problems. If your BMI is in the overweight or obese range, you may be at increased risk for weight-related health problems, such as high blood pressure, heart disease, stroke, arthritis or joint pain, and diabetes. If your BMI is in the underweight range, you may be at increased risk for health problems such as fatigue, lower protection (immunity) against illness, muscle loss, bone loss, hair loss, and hormone problems. BMI is just one measure of your risk for weight-related health problems. You may be at higher risk for health problems if you are not active, you eat an unhealthy diet, or you drink too much alcohol or use tobacco products. Follow-up care is a key part of your treatment and safety. Be sure to make and go to all appointments, and call your doctor if you are having problems. It's also a good idea to know your test results and keep a list of the medicines you take. How can you care for yourself at home? · Practice healthy eating habits. This includes eating plenty of fruits, vegetables, whole grains, lean protein, and low-fat dairy. · If your doctor recommends it, get more exercise. Walking is a good choice. Bit by bit, increase the amount you walk every day. Try for at least 30 minutes on most days of the week. · Do not smoke. Smoking can increase your risk for health problems. If you need help quitting, talk to your doctor about stop-smoking programs and medicines. These can increase your chances of quitting for good. · Limit alcohol to 2 drinks a day for men and 1 drink a day for women. Too much alcohol can cause health problems. If you have a BMI higher than 25  · Your doctor may do other tests to check your risk for weight-related health problems. This may include measuring the distance around your waist. A waist measurement of more than 40 inches in men or 35 inches in women can increase the risk of weight-related health problems. · Talk with your doctor about steps you can take to stay healthy or improve your health. You may need to make lifestyle changes to lose weight and stay healthy, such as changing your diet and getting regular exercise. If you have a BMI lower than 18.5  · Your doctor may do other tests to check your risk for health problems. · Talk with your doctor about steps you can take to stay healthy or improve your health. You may need to make lifestyle changes to gain or maintain weight and stay healthy, such as getting more healthy foods in your diet and doing exercises to build muscle. Where can you learn more? Go to http://cuong-mary kay.info/. Enter S176 in the search box to learn more about \"Body Mass Index: Care Instructions. \"  Current as of: October 13, 2016  Content Version: 11.4  © 6163-8696 Healthwise, Incorporated. Care instructions adapted under license by [employer] (which disclaims liability or warranty for this information). If you have questions about a medical condition or this instruction, always ask your healthcare professional. [address] any warranty or liability for your use of this information.

## 2018-04-25 LAB
ALBUMIN SERPL-MCNC: 3.9 G/DL (ref 3.5–5.5)
ALBUMIN/GLOB SERPL: 1.4 {RATIO} (ref 1.2–2.2)
ALP SERPL-CCNC: 105 IU/L (ref 39–117)
ALT SERPL-CCNC: 12 IU/L (ref 0–32)
AST SERPL-CCNC: 17 IU/L (ref 0–40)
BILIRUB SERPL-MCNC: 0.2 MG/DL (ref 0–1.2)
BUN SERPL-MCNC: 19 MG/DL (ref 6–24)
BUN/CREAT SERPL: 37 (ref 9–23)
CALCIUM SERPL-MCNC: 8.9 MG/DL (ref 8.7–10.2)
CHLORIDE SERPL-SCNC: 102 MMOL/L (ref 96–106)
CO2 SERPL-SCNC: 26 MMOL/L (ref 18–29)
CREAT SERPL-MCNC: 0.52 MG/DL (ref 0.57–1)
GFR SERPLBLD CREATININE-BSD FMLA CKD-EPI: 114 ML/MIN/1.73
GFR SERPLBLD CREATININE-BSD FMLA CKD-EPI: 132 ML/MIN/1.73
GLOBULIN SER CALC-MCNC: 2.8 G/DL (ref 1.5–4.5)
GLUCOSE SERPL-MCNC: 86 MG/DL (ref 65–99)
POTASSIUM SERPL-SCNC: 5 MMOL/L (ref 3.5–5.2)
PROT SERPL-MCNC: 6.7 G/DL (ref 6–8.5)
SODIUM SERPL-SCNC: 140 MMOL/L (ref 134–144)
URATE SERPL-MCNC: 2.4 MG/DL (ref 2.5–7.1)

## 2018-05-04 ENCOUNTER — CLINICAL SUPPORT (OUTPATIENT)
Dept: FAMILY MEDICINE CLINIC | Age: 48
End: 2018-05-04

## 2018-05-04 VITALS
HEART RATE: 58 BPM | WEIGHT: 249.5 LBS | DIASTOLIC BLOOD PRESSURE: 79 MMHG | SYSTOLIC BLOOD PRESSURE: 121 MMHG | BODY MASS INDEX: 42.59 KG/M2 | RESPIRATION RATE: 16 BRPM | HEIGHT: 64 IN

## 2018-05-04 DIAGNOSIS — E66.01 MORBID OBESITY WITH BMI OF 45.0-49.9, ADULT (HCC): Primary | ICD-10-CM

## 2018-05-04 NOTE — PROGRESS NOTES
Progress Note: Weekly Education Class in the [facility] Program   Is there anything that you or the patient needs to let Dr Morrison Presume know about? no  Over the past week, have you experienced any side-effects? no    Dio Duke is a 52 y.o. female who is enrolled in [facility]    There were no vitals taken for this visit. Weight Metrics 4/24/2018 4/24/2018 4/13/2018 3/23/2018 3/15/2018 3/6/2018 3/6/2018   Weight - 248 lb 14.4 oz 250 lb 14.4 oz 258 lb 261 lb 12.8 oz - 264 lb 11.2 oz   Waist Measure Inches 44.25 - 48 - - 45 -   Exercise Mins/week - - - - - - -   Body Fat % 45.4 - 45.6 44.2 44 45 -   BMI - 42.72 kg/m2 43.07 kg/m2 44.29 kg/m2 44.94 kg/m2 - 45.44 kg/m2         Have you received any other medical care this week? no  If yes, where and for what? Have you had any change in your medications since your last visit? no  If yes what? Did you have any problems adhering to the program last week? yes  If yes, please explain: stress      Eating Habits Over Last Week:  Did you take in 64 oz of non-caloric fluids? yes     Did you consume your 4 meal replacements each day?  yes       Physical Activity Over the Past Week:    Aerobic exercise: 0 min  Resistance exercise: 0 workouts / week

## 2018-05-11 ENCOUNTER — CLINICAL SUPPORT (OUTPATIENT)
Dept: FAMILY MEDICINE CLINIC | Age: 48
End: 2018-05-11

## 2018-05-11 VITALS
HEIGHT: 64 IN | RESPIRATION RATE: 16 BRPM | BODY MASS INDEX: 42.03 KG/M2 | SYSTOLIC BLOOD PRESSURE: 118 MMHG | WEIGHT: 246.2 LBS | HEART RATE: 62 BPM | DIASTOLIC BLOOD PRESSURE: 79 MMHG

## 2018-05-11 DIAGNOSIS — E66.01 MORBID OBESITY WITH BMI OF 45.0-49.9, ADULT (HCC): Primary | ICD-10-CM

## 2018-05-22 ENCOUNTER — OFFICE VISIT (OUTPATIENT)
Dept: FAMILY MEDICINE CLINIC | Age: 48
End: 2018-05-22

## 2018-05-22 VITALS
BODY MASS INDEX: 42.08 KG/M2 | HEIGHT: 64 IN | RESPIRATION RATE: 16 BRPM | DIASTOLIC BLOOD PRESSURE: 75 MMHG | WEIGHT: 246.5 LBS | HEART RATE: 60 BPM | SYSTOLIC BLOOD PRESSURE: 112 MMHG

## 2018-05-22 DIAGNOSIS — M25.561 CHRONIC PAIN OF BOTH KNEES: ICD-10-CM

## 2018-05-22 DIAGNOSIS — G89.29 CHRONIC PAIN OF BOTH KNEES: ICD-10-CM

## 2018-05-22 DIAGNOSIS — E66.01 OBESITY, MORBID (HCC): Primary | ICD-10-CM

## 2018-05-22 DIAGNOSIS — M25.562 CHRONIC PAIN OF BOTH KNEES: ICD-10-CM

## 2018-05-22 DIAGNOSIS — R73.03 PREDIABETES: ICD-10-CM

## 2018-05-22 NOTE — PROGRESS NOTES
[facility] Progress Note:   F/up Physician Visit for the VLCD / LCD Program    CC: Weight Management    Jake De León is a 52 y.o. female who is here for her f/up physician visit for the [facility].    Weight History  Ideal body weight: 120 lb 9.5 oz (54.7 kg)  Adjusted ideal body weight: 170 lb 15.3 oz (77.5 kg) (Based on [employer] Tables)    Wt Readings from Last 10 Encounters:   05/22/18 246 lb 8 oz (111.8 kg)   05/11/18 246 lb 3.2 oz (111.7 kg)   05/04/18 249 lb 8 oz (113.2 kg)   04/24/18 248 lb 14.4 oz (112.9 kg)   04/13/18 250 lb 14.4 oz (113.8 kg)   03/23/18 258 lb (117 kg)   03/15/18 261 lb 12.8 oz (118.8 kg)   03/06/18 264 lb 11.2 oz (120.1 kg)   03/02/18 264 lb 11.2 oz (120.1 kg)   02/23/18 268 lb 3.2 oz (121.7 kg)       Weight Metrics 5/22/2018 5/22/2018 5/11/2018 5/11/2018 5/4/2018 4/24/2018 4/24/2018   Weight - 246 lb 8 oz - 246 lb 3.2 oz 249 lb 8 oz - 248 lb 14.4 oz   Waist Measure Inches 46.75 - 48 - (No Data) 44.25 -   Exercise Mins/week - - - - - - -   Body Fat % 45.2 - 45.1 - 45.4 45.4 -   BMI - 42.31 kg/m2 - 42.26 kg/m2 42.83 kg/m2 - 42.72 kg/m2         Medications Currently Taking  Current Outpatient Prescriptions   Medication Sig Dispense Refill    atenolol (TENORMIN) 100 mg tablet Take 50 mg by mouth daily.  PRISTIQ 50 mg ER tablet Take 50 mg by mouth daily.  fexofenadine-pseudoephedrine (ALLEGRA-D 24 HOUR) 180-240 mg per tablet Take 1 Tab by mouth daily.  acetaminophen (TYLENOL ARTHRITIS PAIN) 650 mg TbER Take 650 mg by mouth every eight (8) hours. Participation   Have you been attending class on a regular basis? yes    Review of Systems  Since your last visit, have you experienced any complications? no  Are you taking an appetite suppressant? no    BP Readings from Last 3 Encounters:   05/22/18 112/75   05/11/18 118/79   05/04/18 121/79     Have you received any other medical care this week? no  If yes, where and for what? Have you discontinued or changed any medicine or dose of your medicine(s) since your last visit with Dr Tamiko Melvin? no        Diet  How many ounces of calorie-free liquids did you consume each day? 64 oz  How many meal replacements did you take each day? 4  Did you have any problems adhering to the program?  yes   If yes, please explain:  Temptations at parties and family vacation       Exercise  Aerobic exercise: 30 min w/up at PT  Resistance exercise: 1 workouts / week  Any discomfort?  no        Objective  Visit Vitals    /75    Pulse 60    Resp 16    Ht 5' 4\" (1.626 m)    Wt 246 lb 8 oz (111.8 kg)    BMI 42.31 kg/m2     No LMP recorded. Patient is not currently having periods (Reason: Premenopausal). Physical Exam  Appearance: Morbidly obese, A&O x 3, NAD  HEENT:  NC/AT, EOMI, PERRL, No scleral icterus    Assessment / Plan    Encounter Diagnoses   Name Primary?  Obesity, morbid ([facility] Yes    Prediabetes     Chronic pain of both knees        1. Weight management    improved   Progress was reviewed with patient   Goal(s) for next appointment:   Keep up the good work        2. Labs    Latest results reviewed with patient   Routine monitoring labs ordered no    -Lab slip given to pt for off-site [facility] labs no    10 minutes of the 15 minutes face to face time with Olivia consisted of counseling & coordinating and/or discussing treatment plans in reference to her The primary encounter diagnosis was Obesity, morbid ([facility] Diagnoses of Prediabetes and Chronic pain of both knees were also pertinent to this visit.

## 2018-05-22 NOTE — MR AVS SNAPSHOT
Martin Ray [place]ima [address] [facility] [address] [address] 
[phone] Patient: Aurora Lopez MRN: [id number] AE Visit Information Date & Time Provider Department Dept. Phone Encounter #  
 2018  2:00 PM Cr Brunodeborahgeri [id number] Follow-up Instructions Return in about 4 weeks (around 2018) for MSWL & Lab Draw. Follow-up and Disposition History Your Appointments 2018  6:75 PM  
METABOLIC PROGRAM 5 with HRMP WEEKLY CLASS AMA  
HR Metabolic Program ([facility]) Appt Note: wt check and bp check HR Metabolic Program ([facility]) [phone]  
  
    
 2018  7:46 PM  
METABOLIC PROGRAM 15 with DO [facility] ([facility]) Appt Note: [facility] [address]  
  
   
 [address] Upcoming Health Maintenance Date Due DTaP/Tdap/Td series (1 - Tdap) 1991 PAP AKA CERVICAL CYTOLOGY 1991 Influenza Age 5 to Adult 2018 Allergies as of 2018  Review Complete On: 2018 By: Yvette Villar DO Severity Noted Reaction Type Reactions Bactrim [Sulfamethoprim]  2018    Hives Current Immunizations  Reviewed on 3/6/2018 No immunizations on file. Not reviewed this visit You Were Diagnosed With   
  
 Codes Comments Obesity, morbid ([facility]    -  Primary ICD-10-CM: E66.01 
ICD-9-CM: 278.01 Prediabetes     ICD-10-CM: R73.03 
ICD-9-CM: 790.29 Chronic pain of both knees     ICD-10-CM: M25.561, M25.562, G89.29 ICD-9-CM: 719.46, 338.29 Vitals BP Pulse Resp Height(growth percentile) Weight(growth percentile) BMI  
 112/75 60 16 5' 4\" (1.626 m) 246 lb 8 oz (111.8 kg) 42.31 kg/m2 OB Status Smoking Status Premenopausal Never Smoker Vitals History BMI and BSA Data Body Mass Index Body Surface Area  
 42.31 kg/m 2 2.25 m 2 Your Updated Medication List  
  
   
This list is accurate as of 5/22/18  2:38 PM.  Always use your most recent med list. ALLEGRA-D 24 HOUR 180-240 mg per tablet Generic drug:  fexofenadine-pseudoephedrine Take 1 Tab by mouth daily. atenolol 100 mg tablet Commonly known as:  TENORMIN Take 50 mg by mouth daily. PRISTIQ 50 mg ER tablet Generic drug:  desvenlafaxine succinate Take 50 mg by mouth daily. TYLENOL ARTHRITIS PAIN 650 mg Gayleen Rising Generic drug:  acetaminophen Take 650 mg by mouth every eight (8) hours. Follow-up Instructions Return in about 4 weeks (around 6/19/2018) for MSWL & Lab Draw. Patient Instructions Body Mass Index: Care Instructions Your Care Instructions Body mass index (BMI) can help you see if your weight is raising your risk for health problems. It uses a formula to compare how much you weigh with how tall you are. · A BMI lower than 18.5 is considered underweight. · A BMI between 18.5 and 24.9 is considered healthy. · A BMI between 25 and 29.9 is considered overweight. A BMI of 30 or higher is considered obese. If your BMI is in the normal range, it means that you have a lower risk for weight-related health problems. If your BMI is in the overweight or obese range, you may be at increased risk for weight-related health problems, such as high blood pressure, heart disease, stroke, arthritis or joint pain, and diabetes. If your BMI is in the underweight range, you may be at increased risk for health problems such as fatigue, lower protection (immunity) against illness, muscle loss, bone loss, hair loss, and hormone problems. BMI is just one measure of your risk for weight-related health problems.  You may be at higher risk for health problems if you are not active, you eat an unhealthy diet, or you drink too much alcohol or use tobacco products. Follow-up care is a key part of your treatment and safety. Be sure to make and go to all appointments, and call your doctor if you are having problems. It's also a good idea to know your test results and keep a list of the medicines you take. How can you care for yourself at home? · Practice healthy eating habits. This includes eating plenty of fruits, vegetables, whole grains, lean protein, and low-fat dairy. · If your doctor recommends it, get more exercise. Walking is a good choice. Bit by bit, increase the amount you walk every day. Try for at least 30 minutes on most days of the week. · Do not smoke. Smoking can increase your risk for health problems. If you need help quitting, talk to your doctor about stop-smoking programs and medicines. These can increase your chances of quitting for good. · Limit alcohol to 2 drinks a day for men and 1 drink a day for women. Too much alcohol can cause health problems. If you have a BMI higher than 25 · Your doctor may do other tests to check your risk for weight-related health problems. This may include measuring the distance around your waist. A waist measurement of more than 40 inches in men or 35 inches in women can increase the risk of weight-related health problems. · Talk with your doctor about steps you can take to stay healthy or improve your health. You may need to make lifestyle changes to lose weight and stay healthy, such as changing your diet and getting regular exercise. If you have a BMI lower than 18.5 · Your doctor may do other tests to check your risk for health problems. · Talk with your doctor about steps you can take to stay healthy or improve your health. You may need to make lifestyle changes to gain or maintain weight and stay healthy, such as getting more healthy foods in your diet and doing exercises to build muscle. Where can you learn more? Go to http://cuong-mary kay.info/. Enter S176 in the search box to learn more about \"Body Mass Index: Care Instructions. \" Current as of: October 13, 2016 Content Version: 11.4 © 8639-0236 [employer]. Care instructions adapted under license by [employer] (which disclaims liability or warranty for this information). If you have questions about a medical condition or this instruction, always ask your healthcare professional. [address] any warranty or liability for your use of this information. Patient Instructions History Introducing \[facility]\"" & HEALTH SERVICES! Dear Charmayne Arnold: 
Thank you for requesting a [employer] account. Our records indicate that you already have an active [employer] account. You can access your account anytime at [email or web address] Did you know that you can access your hospital and ER discharge instructions at any time in Mumart? You can also review all of your test results from your hospital stay or ER visit. Additional Information If you have questions, please visit the Frequently Asked Questions section of the [employer] website at [email or web address] Remember, [employer] is NOT to be used for urgent needs. For medical emergencies, dial 911. Now available from your iPhone and Android! Please provide this summary of care documentation to your next provider. If you have any questions after today's visit, please call [phone].

## 2018-06-22 ENCOUNTER — CLINICAL SUPPORT (OUTPATIENT)
Dept: FAMILY MEDICINE CLINIC | Age: 48
End: 2018-06-22

## 2018-06-22 VITALS
RESPIRATION RATE: 16 BRPM | DIASTOLIC BLOOD PRESSURE: 70 MMHG | SYSTOLIC BLOOD PRESSURE: 119 MMHG | WEIGHT: 244 LBS | HEART RATE: 65 BPM | HEIGHT: 64 IN | BODY MASS INDEX: 41.66 KG/M2

## 2018-06-22 DIAGNOSIS — E66.01 MORBID OBESITY WITH BMI OF 40.0-44.9, ADULT (HCC): Primary | ICD-10-CM

## 2018-06-22 NOTE — PROGRESS NOTES
Progress Note: Weekly Education Class in the [facility] Weight Loss Program   Is there anything that you or the patient needs to let Dr Marlena Beal know about? no  Over the past week, have you experienced any side-effects? no    Aurora Lopez is a 52 y.o. female who is enrolled in [place] [facility]    Visit Vitals    Ht 5' 4\" (1.626 m)     Weight Metrics 5/22/2018 5/22/2018 5/11/2018 5/11/2018 5/4/2018 4/24/2018 4/24/2018   Weight - 246 lb 8 oz - 246 lb 3.2 oz 249 lb 8 oz - 248 lb 14.4 oz   Waist Measure Inches 46.75 - 48 - (No Data) 44.25 -   Exercise Mins/week - - - - - - -   Body Fat % 45.2 - 45.1 - 45.4 45.4 -   BMI - 42.31 kg/m2 - 42.26 kg/m2 42.83 kg/m2 - 42.72 kg/m2         Have you received any other medical care this week? no  If yes, where and for what? Have you had any change in your medications since your last visit? no  If yes what? Did you have any problems adhering to the program last week? no  If yes, please explain:       Eating Habits Over Last Week:  Did you take in 64 oz of non-caloric fluids? no     Did you consume your 4 meal replacements each day?  yes       Physical Activity Over the Past Week:    Aerobic exercise: 30 min  Resistance exercise: 3 workouts / week

## 2018-06-26 ENCOUNTER — OFFICE VISIT (OUTPATIENT)
Dept: FAMILY MEDICINE CLINIC | Age: 48
End: 2018-06-26

## 2018-06-26 VITALS
HEIGHT: 64 IN | DIASTOLIC BLOOD PRESSURE: 84 MMHG | RESPIRATION RATE: 16 BRPM | WEIGHT: 243.8 LBS | BODY MASS INDEX: 41.62 KG/M2 | SYSTOLIC BLOOD PRESSURE: 117 MMHG | HEART RATE: 65 BPM

## 2018-06-26 DIAGNOSIS — E66.01 OBESITY, MORBID (HCC): Primary | ICD-10-CM

## 2018-06-26 DIAGNOSIS — M25.561 CHRONIC PAIN OF BOTH KNEES: ICD-10-CM

## 2018-06-26 DIAGNOSIS — M25.562 CHRONIC PAIN OF BOTH KNEES: ICD-10-CM

## 2018-06-26 DIAGNOSIS — G89.29 CHRONIC PAIN OF BOTH KNEES: ICD-10-CM

## 2018-06-26 DIAGNOSIS — R73.03 PREDIABETES: ICD-10-CM

## 2018-06-26 NOTE — PATIENT INSTRUCTIONS
Body Mass Index: Care Instructions  Your Care Instructions    Body mass index (BMI) can help you see if your weight is raising your risk for health problems. It uses a formula to compare how much you weigh with how tall you are. · A BMI lower than 18.5 is considered underweight. · A BMI between 18.5 and 24.9 is considered healthy. · A BMI between 25 and 29.9 is considered overweight. A BMI of 30 or higher is considered obese. If your BMI is in the normal range, it means that you have a lower risk for weight-related health problems. If your BMI is in the overweight or obese range, you may be at increased risk for weight-related health problems, such as high blood pressure, heart disease, stroke, arthritis or joint pain, and diabetes. If your BMI is in the underweight range, you may be at increased risk for health problems such as fatigue, lower protection (immunity) against illness, muscle loss, bone loss, hair loss, and hormone problems. BMI is just one measure of your risk for weight-related health problems. You may be at higher risk for health problems if you are not active, you eat an unhealthy diet, or you drink too much alcohol or use tobacco products. Follow-up care is a key part of your treatment and safety. Be sure to make and go to all appointments, and call your doctor if you are having problems. It's also a good idea to know your test results and keep a list of the medicines you take. How can you care for yourself at home? · Practice healthy eating habits. This includes eating plenty of fruits, vegetables, whole grains, lean protein, and low-fat dairy. · If your doctor recommends it, get more exercise. Walking is a good choice. Bit by bit, increase the amount you walk every day. Try for at least 30 minutes on most days of the week. · Do not smoke. Smoking can increase your risk for health problems. If you need help quitting, talk to your doctor about stop-smoking programs and medicines. These can increase your chances of quitting for good. · Limit alcohol to 2 drinks a day for men and 1 drink a day for women. Too much alcohol can cause health problems. If you have a BMI higher than 25  · Your doctor may do other tests to check your risk for weight-related health problems. This may include measuring the distance around your waist. A waist measurement of more than 40 inches in men or 35 inches in women can increase the risk of weight-related health problems. · Talk with your doctor about steps you can take to stay healthy or improve your health. You may need to make lifestyle changes to lose weight and stay healthy, such as changing your diet and getting regular exercise. If you have a BMI lower than 18.5  · Your doctor may do other tests to check your risk for health problems. · Talk with your doctor about steps you can take to stay healthy or improve your health. You may need to make lifestyle changes to gain or maintain weight and stay healthy, such as getting more healthy foods in your diet and doing exercises to build muscle. Where can you learn more? Go to http://cuong-mary kay.info/. Enter S176 in the search box to learn more about \"Body Mass Index: Care Instructions. \"  Current as of: October 13, 2016  Content Version: 11.4  © 4206-6873 Healthwise, Incorporated. Care instructions adapted under license by [employer] (which disclaims liability or warranty for this information). If you have questions about a medical condition or this instruction, always ask your healthcare professional. [address] any warranty or liability for your use of this information.

## 2018-06-26 NOTE — MR AVS SNAPSHOT
Martin Ray Lima [address] [facility] [address] [facility] [address] [address] 
[phone] Patient: Aurora Lopez MRN: [id number] Visit Information Date & Time Provider Department Dept. Phone Encounter #  
 6/26/2018  6:[id number] Follow-up Instructions Return in about 8 weeks (around 8/21/2018) for MSWL & Lab Draw. Routing History Your Appointments 7/3/2018  5:73 PM  
METABOLIC PROGRAM 5 with HRMP WEEKLY CLASS AMA  
HR Metabolic Program ([facility]) Appt Note: wt check and bp check HR Metabolic Program ([facility]) [phone] 7/10/2018  5:27 PM  
METABOLIC PROGRAM 5 with HRMP WEEKLY CLASS AMA  
HR Metabolic Program ([facility]) Appt Note: wt check and bp check HR Metabolic Program ([facility]) [phone] 7/19/2018 94:44 AM  
METABOLIC PROGRAM 5 with HRMP WEEKLY CLASS AMA  
HR Metabolic Program ([facility]) Appt Note: wt check and bp check HR Metabolic Program ([facility]) [phone] Upcoming Health Maintenance Date Due DTaP/Tdap/Td series (1 - Tdap) 9/23/1991 PAP AKA CERVICAL CYTOLOGY 9/23/1991 Influenza Age 5 to Adult 8/1/2018 Allergies as of 6/26/2018  Review Complete On: 6/26/2018 By: Yvette Villar DO Severity Noted Reaction Type Reactions Bactrim [Sulfamethoprim]  01/11/2018    Hives Current Immunizations  Reviewed on 3/6/2018 No immunizations on file. Not reviewed this visit You Were Diagnosed With   
  
 Codes Comments Obesity, morbid ([facility] [address]    -  Primary ICD-10-CM: E66.01 
ICD-9-CM: 278.01 Chronic pain of both knees     ICD-10-CM: M25.561, M25.562, G89.29 ICD-9-CM: 719.46, 338.29 Prediabetes     ICD-10-CM: R73.03 
ICD-9-CM: 790.29 Vitals BP Pulse Resp Height(growth percentile) Weight(growth percentile) BMI 117/84 65 16 5' 4\" (1.626 m) 243 lb 12.8 oz (110.6 kg) 41.85 kg/m2 OB Status Smoking Status Premenopausal Never Smoker Vitals History BMI and BSA Data Body Mass Index Body Surface Area  
 41.85 kg/m 2 2.23 m 2 Your Updated Medication List  
  
   
This list is accurate as of 6/26/18  6:47 PM.  Always use your most recent med list. ALLEGRA-D 24 HOUR 180-240 mg per tablet Generic drug:  fexofenadine-pseudoephedrine Take 1 Tab by mouth daily. atenolol 100 mg tablet Commonly known as:  TENORMIN Take 50 mg by mouth daily. PRISTIQ 50 mg ER tablet Generic drug:  desvenlafaxine succinate Take 50 mg by mouth daily. TYLENOL ARTHRITIS PAIN 650 mg Andrea Asa Generic drug:  acetaminophen Take 650 mg by mouth every eight (8) hours. Follow-up Instructions Return in about 8 weeks (around 8/21/2018) for MSWL & Lab Draw. Patient Instructions Body Mass Index: Care Instructions Your Care Instructions Body mass index (BMI) can help you see if your weight is raising your risk for health problems. It uses a formula to compare how much you weigh with how tall you are. · A BMI lower than 18.5 is considered underweight. · A BMI between 18.5 and 24.9 is considered healthy. · A BMI between 25 and 29.9 is considered overweight. A BMI of 30 or higher is considered obese. If your BMI is in the normal range, it means that you have a lower risk for weight-related health problems. If your BMI is in the overweight or obese range, you may be at increased risk for weight-related health problems, such as high blood pressure, heart disease, stroke, arthritis or joint pain, and diabetes. If your BMI is in the underweight range, you may be at increased risk for health problems such as fatigue, lower protection (immunity) against illness, muscle loss, bone loss, hair loss, and hormone problems. BMI is just one measure of your risk for weight-related health problems. You may be at higher risk for health problems if you are not active, you eat an unhealthy diet, or you drink too much alcohol or use tobacco products. Follow-up care is a key part of your treatment and safety. Be sure to make and go to all appointments, and call your doctor if you are having problems. It's also a good idea to know your test results and keep a list of the medicines you take. How can you care for yourself at home? · Practice healthy eating habits. This includes eating plenty of fruits, vegetables, whole grains, lean protein, and low-fat dairy. · If your doctor recommends it, get more exercise. Walking is a good choice. Bit by bit, increase the amount you walk every day. Try for at least 30 minutes on most days of the week. · Do not smoke. Smoking can increase your risk for health problems. If you need help quitting, talk to your doctor about stop-smoking programs and medicines. These can increase your chances of quitting for good. · Limit alcohol to 2 drinks a day for men and 1 drink a day for women. Too much alcohol can cause health problems. If you have a BMI higher than 25 · Your doctor may do other tests to check your risk for weight-related health problems. This may include measuring the distance around your waist. A waist measurement of more than 40 inches in men or 35 inches in women can increase the risk of weight-related health problems. · Talk with your doctor about steps you can take to stay healthy or improve your health. You may need to make lifestyle changes to lose weight and stay healthy, such as changing your diet and getting regular exercise. If you have a BMI lower than 18.5 · Your doctor may do other tests to check your risk for health problems. · Talk with your doctor about steps you can take to stay healthy or improve your health.  You may need to make lifestyle changes to gain or maintain weight and stay healthy, such as getting more healthy foods in your diet and doing exercises to build muscle. Where can you learn more? Go to http://cuong-mary kay.info/. Enter S176 in the search box to learn more about \"Body Mass Index: Care Instructions. \" Current as of: October 13, 2016 Content Version: 11.4 © 1503-6675 [employer]. Care instructions adapted under license by [employer] (which disclaims liability or warranty for this information). If you have questions about a medical condition or this instruction, always ask your healthcare professional. [address] any warranty or liability for your use of this information. Introducing [facility]! Dear Jelena Peters: 
Thank you for requesting a ipsy account. Our records indicate that you already have an active ipsy account. You can access your account anytime at [email or web address] Did you know that you can access your hospital and ER discharge instructions at any time in ipsy? You can also review all of your test results from your hospital stay or ER visit. Additional Information If you have questions, please visit the Frequently Asked Questions section of the ipsy website at [email or web address] Remember, ipsy is NOT to be used for urgent needs. For medical emergencies, dial 911. Now available from your iPhone and Android! Please provide this summary of care documentation to your next provider. If you have any questions after today's visit, please call [phone].

## 2018-06-26 NOTE — Clinical Note
Nadine Baker is going to be out of town July 20 - Aug 20. Please put her on a leave of absence during that time.   Thank you : )

## 2018-06-26 NOTE — PROGRESS NOTES
[facility] [place] Weight Loss Program Progress Note:   F/up Physician Visit for the VLCD / LCD Program    CC: Weight Management    Dio Duke is a 52 y.o. female who is here for her f/up physician visit for the [facility] Program.    Weight History  Ideal body weight: 120 lb 9.5 oz (54.7 kg)  Adjusted ideal body weight: 169 lb 14 oz (77.1 kg) (Based on [address] [facility] [address])    Wt Readings from Last 10 Encounters:   06/26/18 243 lb 12.8 oz (110.6 kg)   06/22/18 244 lb (110.7 kg)   05/22/18 246 lb 8 oz (111.8 kg)   05/11/18 246 lb 3.2 oz (111.7 kg)   05/04/18 249 lb 8 oz (113.2 kg)   04/24/18 248 lb 14.4 oz (112.9 kg)   04/13/18 250 lb 14.4 oz (113.8 kg)   03/23/18 258 lb (117 kg)   03/15/18 261 lb 12.8 oz (118.8 kg)   03/06/18 264 lb 11.2 oz (120.1 kg)       Weight Metrics 6/26/2018 6/26/2018 6/22/2018 6/22/2018 5/22/2018 5/22/2018 5/11/2018   Weight - 243 lb 12.8 oz - 244 lb - 246 lb 8 oz -   Waist Measure Inches 48.25 - 47 - 46.75 - 48   Exercise Mins/week - - - - - - -   Body Fat % 44.9 - 44.9 - 45.2 - 45.1   BMI - 41.85 kg/m2 - 41.88 kg/m2 - 42.31 kg/m2 -         Medications Currently Taking        Participation   Have you been attending class on a regular basis? no    Review of Systems  Since your last visit, have you experienced any complications? no  If yes, please list:     Are you taking an appetite suppressant? no    BP Readings from Last 3 Encounters:   06/26/18 117/84   06/22/18 119/70   05/22/18 112/75       Have you received any other medical care this week? no  If yes, where and for what? Have you discontinued or changed any medicine or dose of your medicine(s) since your last visit with Dr Tamiko Presume? no    If yes, where and for what? Diet  How many ounces of calorie-free liquids did you consume each day? 64  oz  How many meal replacements did you take each day?  4  Did you have any problems adhering to the program?  no   If yes, please explain:      Exercise  Aerobic exercise: 30 min - PT (tomorrow is her last session)  Resistance exercise: 1 workouts / week  Any discomfort?  no        Objective  Visit Vitals    /84    Pulse 65    Resp 16    Ht 5' 4\" (1.626 m)    Wt 243 lb 12.8 oz (110.6 kg)    BMI 41.85 kg/m2     No LMP recorded. Patient is not currently having periods (Reason: Premenopausal). Physical Exam  Appearance: Morbidly obese, A&O x 3, NAD  HEENT:  NC/AT, EOMI, PERRL, No scleral icterus    Assessment / Plan    No diagnosis found. 1.  Weight management    improved   Progress was reviewed with patient   Goal(s) for next appointment:   Work through    2. Labs    Latest results reviewed with patient   Routine monitoring labs ordered yes    -Lab slip given to pt for off-site [facility] labs no    10 minutes of the 15 minutes face to face time with Olivia consisted of counseling & coordinating and/or discussing treatment plans in reference to her There were no encounter diagnoses.

## 2018-07-03 ENCOUNTER — CLINICAL SUPPORT (OUTPATIENT)
Dept: FAMILY MEDICINE CLINIC | Age: 48
End: 2018-07-03

## 2018-07-03 VITALS
DIASTOLIC BLOOD PRESSURE: 80 MMHG | HEART RATE: 55 BPM | SYSTOLIC BLOOD PRESSURE: 121 MMHG | HEIGHT: 64 IN | BODY MASS INDEX: 42.29 KG/M2 | WEIGHT: 247.7 LBS

## 2018-07-03 DIAGNOSIS — E66.01 MORBID OBESITY WITH BMI OF 40.0-44.9, ADULT (HCC): Primary | ICD-10-CM

## 2018-07-03 NOTE — PROGRESS NOTES
Progress Note: Weekly Education Class in the [facility] Weight Loss Program   Is there anything that you or the patient needs to let Dr Gustavo Cleveland know about? no  Over the past week, have you experienced any side-effects? no    Maribel Hamilton is a 52 y.o. female who is enrolled in [facility] Program    Visit Vitals    Wt 247 lb 11.2 oz (112.4 kg)    BMI 42.52 kg/m2     Weight Metrics 7/3/2018 6/26/2018 6/26/2018 6/22/2018 6/22/2018 5/22/2018 5/22/2018   Weight 247 lb 11.2 oz - 243 lb 12.8 oz - 244 lb - 246 lb 8 oz   Waist Measure Inches - 48.25 - 47 - 46.75 -   Exercise Mins/week - - - - - - -   Body Fat % - 44.9 - 44.9 - 45.2 -   BMI 42.52 kg/m2 - 41.85 kg/m2 - 41.88 kg/m2 - 42.31 kg/m2         Have you received any other medical care this week? no  If yes, where and for what? Have you had any change in your medications since your last visit? no  If yes what? Did you have any problems adhering to the program last week? no  If yes, please explain:       Eating Habits Over Last Week:  Did you take in 64 oz of non-caloric fluids? yes     Did you consume your 4 meal replacements each day?  yes       Physical Activity Over the Past Week:    Aerobic exercise: 0 min  Resistance exercise: 0 workouts / week

## 2018-07-10 ENCOUNTER — CLINICAL SUPPORT (OUTPATIENT)
Dept: FAMILY MEDICINE CLINIC | Age: 48
End: 2018-07-10

## 2018-07-10 VITALS
HEIGHT: 64 IN | BODY MASS INDEX: 41.45 KG/M2 | WEIGHT: 242.8 LBS | DIASTOLIC BLOOD PRESSURE: 78 MMHG | HEART RATE: 62 BPM | SYSTOLIC BLOOD PRESSURE: 116 MMHG

## 2018-07-10 DIAGNOSIS — E66.01 MORBID OBESITY WITH BMI OF 40.0-44.9, ADULT (HCC): Primary | ICD-10-CM

## 2018-07-10 NOTE — PROGRESS NOTES
Progress Note: Weekly Education Class in the [facility] Weight Loss Program   Is there anything that you or the patient needs to let Dr Ema Tam know about? no  Over the past week, have you experienced any side-effects? no    Aureliano Francis is a 52 y.o. female who is enrolled in [facility] Weight Loss Program    Visit Vitals    Ht 5' 4\" (1.626 m)    Wt 242 lb 12.8 oz (110.1 kg)    BMI 41.68 kg/m2     Weight Metrics 7/10/2018 7/3/2018 6/26/2018 6/26/2018 6/22/2018 6/22/2018 5/22/2018   Weight 242 lb 12.8 oz 247 lb 11.2 oz - 243 lb 12.8 oz - 244 lb -   Waist Measure Inches - - 48.25 - 47 - 46.75   Exercise Mins/week - - - - - - -   Body Fat % - 45.3 44.9 - 44.9 - 45.2   BMI 41.68 kg/m2 42.52 kg/m2 - 41.85 kg/m2 - 41.88 kg/m2 -         Have you received any other medical care this week? no  If yes, where and for what? Have you had any change in your medications since your last visit? no  If yes what? Did you have any problems adhering to the program last week? no  If yes, please explain:       Eating Habits Over Last Week:  Did you take in 64 oz of non-caloric fluids? yes     Did you consume your 4 meal replacements each day?  yes       Physical Activity Over the Past Week:    Aerobic exercise: 0 min  Resistance exercise: 0 workouts / week

## 2018-08-23 LAB
ALBUMIN SERPL-MCNC: 4.1 G/DL (ref 3.5–5.5)
ALBUMIN/GLOB SERPL: 1.6 {RATIO} (ref 1.2–2.2)
ALP SERPL-CCNC: 99 IU/L (ref 39–117)
ALT SERPL-CCNC: 9 IU/L (ref 0–32)
AST SERPL-CCNC: 17 IU/L (ref 0–40)
BILIRUB SERPL-MCNC: 0.2 MG/DL (ref 0–1.2)
BUN SERPL-MCNC: 15 MG/DL (ref 6–24)
BUN/CREAT SERPL: 24 (ref 9–23)
CALCIUM SERPL-MCNC: 9 MG/DL (ref 8.7–10.2)
CHLORIDE SERPL-SCNC: 102 MMOL/L (ref 96–106)
CO2 SERPL-SCNC: 23 MMOL/L (ref 20–29)
CREAT SERPL-MCNC: 0.62 MG/DL (ref 0.57–1)
GLOBULIN SER CALC-MCNC: 2.6 G/DL (ref 1.5–4.5)
GLUCOSE SERPL-MCNC: 108 MG/DL (ref 65–99)
POTASSIUM SERPL-SCNC: 4.1 MMOL/L (ref 3.5–5.2)
PROT SERPL-MCNC: 6.7 G/DL (ref 6–8.5)
SODIUM SERPL-SCNC: 143 MMOL/L (ref 134–144)

## 2018-09-25 ENCOUNTER — OFFICE VISIT (OUTPATIENT)
Dept: FAMILY MEDICINE CLINIC | Age: 48
End: 2018-09-25

## 2018-09-25 VITALS
BODY MASS INDEX: 43.13 KG/M2 | WEIGHT: 252.6 LBS | RESPIRATION RATE: 18 BRPM | HEIGHT: 64 IN | OXYGEN SATURATION: 97 % | HEART RATE: 64 BPM | SYSTOLIC BLOOD PRESSURE: 126 MMHG | DIASTOLIC BLOOD PRESSURE: 87 MMHG | TEMPERATURE: 97.3 F

## 2018-09-25 DIAGNOSIS — E66.01 MORBID OBESITY WITH BMI OF 40.0-44.9, ADULT (HCC): Primary | ICD-10-CM

## 2018-09-25 NOTE — MR AVS SNAPSHOT
Martin Ray Lima [address] [facility] [address] [facility] [address] [address] 
[phone] Patient: Amanda Jane MRN: [id number]:3[id number] Visit Information Date & Time Provider Department Dept. Phone Encounter #  
 9/25/2018  5:15 PM Ilana Lopez, [address] [facility] [phone] [id number] Upcoming Health Maintenance Date Due DTaP/Tdap/Td series (1 - Tdap) 9/23/1991 PAP AKA CERVICAL CYTOLOGY 9/23/1991 Influenza Age 5 to Adult 8/1/2018 Allergies as of 9/25/2018  Review Complete On: 9/25/2018 By: Ilana Lopez,  Severity Noted Reaction Type Reactions Bactrim [Sulfamethoprim]  01/11/2018    Hives Current Immunizations  Reviewed on 3/6/2018 No immunizations on file. Not reviewed this visit You Were Diagnosed With   
  
 Codes Comments Morbid obesity with BMI of 40.0-44.9, [facility]    -  Primary ICD-10-CM: E66.01, Z68.41 
ICD-9-CM: 278.01, V85.41 Vitals BP Pulse Temp Resp Height(growth percentile) Weight(growth percentile) 126/87 64 97.3 °F (36.3 °C) (Oral) 18 5' 4\" (1.626 m) 252 lb 9.6 oz (114.6 kg) SpO2 BMI OB Status Smoking Status 97% 43.36 kg/m2 Premenopausal Never Smoker Vitals History BMI and BSA Data Body Mass Index Body Surface Area  
 43.36 kg/m 2 2.27 m 2 Your Updated Medication List  
  
   
This list is accurate as of 9/25/18  5:52 PM.  Always use your most recent med list. ALLEGRA-D 24 HOUR 180-240 mg per tablet Generic drug:  fexofenadine-pseudoephedrine Take 1 Tab by mouth daily. atenolol 100 mg tablet Commonly known as:  TENORMIN Take 50 mg by mouth daily. PRISTIQ 50 mg ER tablet Generic drug:  desvenlafaxine succinate Take 50 mg by mouth daily. TYLENOL ARTHRITIS PAIN 650 mg Grover Lesches Generic drug:  acetaminophen Take 650 mg by mouth every eight (8) hours. Introducing \[facility]\"" & HEALTH SERVICES!    
 Dear Harmony Peng: 
 Thank you for requesting a [employer] account. Our records indicate that you already have an active [employer] account. You can access your account anytime at [email or web address] ([email or web address]). [employer] ([email or web address]) Did you know that you can access your hospital and ER discharge instructions at any time in [employer]? You can also review all of your test results from your hospital stay or ER visit. Additional Information If you have questions, please visit the Frequently Asked Questions section of the [employer] website at [email or web address] ([email or web address]). [employer] (V3D)/. Remember, [employer] is NOT to be used for urgent needs. For medical emergencies, dial 911. Now available from your iPhone and Android! Please provide this summary of care documentation to your next provider. If you have any questions after today's visit, please call [phone].

## 2018-09-25 NOTE — PROGRESS NOTES
[facility] Progress Note:   F/up Physician Visit for the [facility] / LCD Program    CC: Weight Management    Cory Hurtado is a 50 y.o. female who is here for her f/up physician visit for the [facility].    Weight History  Ideal body weight: 120 lb 9.5 oz (54.7 kg)  Adjusted ideal body weight: 173 lb 6.3 oz (78.7 kg) (Based on [employer] Tables)    Wt Readings from Last 10 Encounters:   09/25/18 252 lb 9.6 oz (114.6 kg)   07/19/18 246 lb 14.4 oz (112 kg)   07/10/18 242 lb 12.8 oz (110.1 kg)   07/03/18 247 lb 11.2 oz (112.4 kg)   06/26/18 243 lb 12.8 oz (110.6 kg)   06/22/18 244 lb (110.7 kg)   05/22/18 246 lb 8 oz (111.8 kg)   05/11/18 246 lb 3.2 oz (111.7 kg)   05/04/18 249 lb 8 oz (113.2 kg)   04/24/18 248 lb 14.4 oz (112.9 kg)       Olivia is back after her summer travels and she's ready to get back on the program full force. \"I now know what I need to do. \"          Weight Metrics 9/25/2018 9/25/2018 7/19/2018 7/19/2018 7/10/2018 7/3/2018 6/26/2018   Weight - 252 lb 9.6 oz - 246 lb 14.4 oz 242 lb 12.8 oz 247 lb 11.2 oz -   Waist Measure Inches 50.5 - 47.5 - - - 48.25   Exercise Mins/week - - - - - - -   Body Fat % 45.9 - 45.2 - - 45.3 44.9   BMI - 43.36 kg/m2 - 42.38 kg/m2 41.68 kg/m2 42.52 kg/m2 -         Medications Currently Taking  Current Outpatient Prescriptions   Medication Sig Dispense Refill    atenolol (TENORMIN) 100 mg tablet Take 50 mg by mouth daily.  PRISTIQ 50 mg ER tablet Take 50 mg by mouth daily.  fexofenadine-pseudoephedrine (ALLEGRA-D 24 HOUR) 180-240 mg per tablet Take 1 Tab by mouth daily.  acetaminophen (TYLENOL ARTHRITIS PAIN) 650 mg TbER Take 650 mg by mouth every eight (8) hours. Participation   Have you been attending class on a regular basis?  No; started back yesterday     Review of Systems  Since your last visit, have you experienced any complications? no  If yes, please list: no    Are you taking an appetite suppressant? no  If so:  Do you need a refill? no  Are you experiencine any Chest Pain, Palpitations or Dizziness? no    BP Readings from Last 3 Encounters:   09/25/18 126/87   07/19/18 105/67   07/10/18 116/78           Have you received any other medical care this week? no      Have you discontinued or changed any medicine or dose of your medicine(s) since your last visit with Dr Yann Babin? no        Diet  How many ounces of calorie-free liquids did you consume each day? 64 oz started back yesterday  How many meal replacements did you take each day? 4  Did you have any problems adhering to the program?  no       Exercise  Aerobic exercise: 60 min  Resistance exercise: 2 workouts / week      Objective  Visit Vitals    /87    Pulse 64    Temp 97.3 °F (36.3 °C) (Oral)    Resp 18    Ht 5' 4\" (1.626 m)    Wt 252 lb 9.6 oz (114.6 kg)    SpO2 97%    BMI 43.36 kg/m2     No LMP recorded. Patient is not currently having periods (Reason: Premenopausal). Physical Exam  Appearance: Morbid Obesity, A&O x 3, NAD  HEENT:  NC/AT, EOMI, PERRL, No scleral icterus    Assessment / Plan    Encounter Diagnosis   Name Primary?  [facility] with BMI [facility] 40.0-44.9, [facility] Yes       1. Weight management    needs improvement   Progress was reviewed with patient   Goal(s) for next appointment:   Clean up clutter in her house - she's Manuela Sanchez me when she does        2. Labs    Latest results reviewed with patient   Routine monitoring labs ordered no    -Lab slip given to pt for off-site [facility] labs no    10 minutes of the 15 minutes face to face time with Olivia consisted of counseling & coordinating and/or discussing treatment plans in reference to her The encounter diagnosis was Morbid obesity with BMI of 40.0-44.9, adult ([facility]

## 2018-10-05 ENCOUNTER — CLINICAL SUPPORT (OUTPATIENT)
Dept: FAMILY MEDICINE CLINIC | Age: 48
End: 2018-10-05

## 2018-10-05 VITALS
BODY MASS INDEX: 42.53 KG/M2 | DIASTOLIC BLOOD PRESSURE: 82 MMHG | RESPIRATION RATE: 16 BRPM | HEIGHT: 64 IN | WEIGHT: 249.1 LBS | SYSTOLIC BLOOD PRESSURE: 122 MMHG | HEART RATE: 55 BPM

## 2018-10-05 DIAGNOSIS — E66.01 MORBID OBESITY WITH BMI OF 40.0-44.9, ADULT (HCC): Primary | ICD-10-CM

## 2018-10-05 NOTE — PROGRESS NOTES
Progress Note: Weekly Education Class in the [facility]   Is there anything that you or the patient needs to let Dr Elicia Prabhakar know about? no  Over the past week, have you experienced any side-effects? no    Ancelmo Vides is a 50 y.o. female who is enrolled in [facility]    Visit Vitals    Ht 5' 4\" (1.626 m)     Weight Metrics 9/25/2018 9/25/2018 7/19/2018 7/19/2018 7/10/2018 7/3/2018 6/26/2018   Weight - 252 lb 9.6 oz - 246 lb 14.4 oz 242 lb 12.8 oz 247 lb 11.2 oz -   Waist Measure Inches 50.5 - 47.5 - - - 48.25   Exercise Mins/week - - - - - - -   Body Fat % 45.9 - 45.2 - - 45.3 44.9   BMI - 43.36 kg/m2 - 42.38 kg/m2 41.68 kg/m2 42.52 kg/m2 -         Have you received any other medical care this week? no  If yes, where and for what? Have you had any change in your medications since your last visit? no  If yes what? Did you have any problems adhering to the program last week? no  If yes, please explain:       Eating Habits Over Last Week:  Did you take in 64 oz of non-caloric fluids? no     Did you consume your 4 meal replacements each day?  yes       Physical Activity Over the Past Week:    Aerobic exercise: 60 min  Resistance exercise: 2 workouts / week